# Patient Record
Sex: FEMALE | Race: WHITE | HISPANIC OR LATINO | Employment: STUDENT | URBAN - METROPOLITAN AREA
[De-identification: names, ages, dates, MRNs, and addresses within clinical notes are randomized per-mention and may not be internally consistent; named-entity substitution may affect disease eponyms.]

---
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
## 2021-01-05 ENCOUNTER — CLINICAL SUPPORT (OUTPATIENT)
Dept: OBGYN CLINIC | Facility: CLINIC | Age: 21
End: 2021-01-05
Payer: COMMERCIAL

## 2021-01-05 VITALS
RESPIRATION RATE: 16 BRPM | DIASTOLIC BLOOD PRESSURE: 60 MMHG | WEIGHT: 140.2 LBS | HEART RATE: 88 BPM | SYSTOLIC BLOOD PRESSURE: 90 MMHG | BODY MASS INDEX: 24.44 KG/M2

## 2021-01-05 DIAGNOSIS — N94.6 DYSMENORRHEA: Primary | ICD-10-CM

## 2021-01-05 DIAGNOSIS — Z30.42 ENCOUNTER FOR SURVEILLANCE OF INJECTABLE CONTRACEPTIVE: ICD-10-CM

## 2021-01-05 LAB — SL AMB POCT URINE HCG: NEGATIVE

## 2021-01-05 PROCEDURE — 96372 THER/PROPH/DIAG INJ SC/IM: CPT | Performed by: OBSTETRICS & GYNECOLOGY

## 2021-01-05 PROCEDURE — 81025 URINE PREGNANCY TEST: CPT | Performed by: OBSTETRICS & GYNECOLOGY

## 2021-01-05 RX ADMIN — MEDROXYPROGESTERONE ACETATE 150 MG: 150 INJECTION, SUSPENSION INTRAMUSCULAR at 11:28

## 2021-01-05 NOTE — PROGRESS NOTES
Patient here for second depo injection  Pt ID by name and   UPT negative  Pt denies any headaches, pain in arms/legs, chest pain, SOB, leg cramps, or vaginal bleeding  Discussed need for 1200 mg of calcium daily, including dietary sources, supplements as needed  Pt advised to call for SOB, chest pain, headaches, visual disturbances, abdominal pain, extremity pain  Pt verbalized understanding  Injection site: right deltoid    Next Injection Due: 3/23-    Last Annual: Scheduled for 2021 [place] [place])    Pt tolerated injection well

## 2021-02-22 ENCOUNTER — ANNUAL EXAM (OUTPATIENT)
Dept: OBGYN CLINIC | Facility: CLINIC | Age: 21
End: 2021-02-22
Payer: COMMERCIAL

## 2021-02-22 VITALS — SYSTOLIC BLOOD PRESSURE: 98 MMHG | WEIGHT: 136.8 LBS | DIASTOLIC BLOOD PRESSURE: 62 MMHG | BODY MASS INDEX: 23.85 KG/M2

## 2021-02-22 DIAGNOSIS — N92.6 IRREGULAR MENSTRUAL CYCLE: ICD-10-CM

## 2021-02-22 DIAGNOSIS — Z01.419 WOMEN'S ANNUAL ROUTINE GYNECOLOGICAL EXAMINATION: Primary | ICD-10-CM

## 2021-02-22 PROCEDURE — S0612 ANNUAL GYNECOLOGICAL EXAMINA: HCPCS | Performed by: OBSTETRICS & GYNECOLOGY

## 2021-02-22 RX ORDER — MEDROXYPROGESTERONE ACETATE 150 MG/ML
150 INJECTION, SUSPENSION INTRAMUSCULAR
Qty: 1 ML | Refills: 4 | Status: SHIPPED | OUTPATIENT
Start: 2021-02-22 | End: 2021-06-14 | Stop reason: ALTCHOICE

## 2021-02-22 NOTE — PROGRESS NOTES
ASSESSMENT & PLAN: Jacqueline Delgado is a 24 y o   with normal gynecologic exam     1   Routine well woman exam done today  2   Pap smears will start at age 25 per the ASCCP guidelines  3   Gardasil is recommended for patients from 526 years of age  The patient has had the Gardasil vaccine series  4  The patient is not currently sexually active  She is using depo provera for contraception/menstrual control and options have been discussed  5  The following were reviewed in today's visit: breast self exam, STD testing, use and side effects of OCPs, exercise and healthy diet  6  Patient to return to office in 12 months for annual      All questions have been answered to her satisfaction  CC:  Annual Gynecologic Examination    HPI: Jacqueline Delgado is a 24 y o  Nestor Morrissey who presents for annual gynecologic examination  She has the following concerns:  none  Health Maintenance:    She exercises 2 days per week  She wears her seatbelt routinely  She does perform irregular monthly self breast exams  She feels safe at home  Patients does follow a reg diet  Past Medical History:   Diagnosis Date    Eczematous dermatitis     LAST ASSESSED: 17    Microscopic hematuria     LAST ASSESSED: 17    Vitamin D deficiency     LAST ASSESSED: 17       No past surgical history on file  Past OB/Gyn History:  Period Pattern: (!) Irregular(on depo)  Menstrual Flow: LightNo LMP recorded  (Menstrual status: Birth Control)  Menstrual History:  OB History        0    Para   0    Term   0       0    AB   0    Living   0       SAB   0    TAB   0    Ectopic   0    Multiple   0    Live Births   0              History of sexually transmitted infection No  Patient is not currently sexually active  heterosexual Birth control: Depo-Provera Inj      Family History   Problem Relation Age of Onset    Sick sinus syndrome Paternal Grandmother         TACHYCARDIA    No Known Problems Mother     Hypertension Paternal Grandfather     Stroke Paternal Grandfather     Hyperlipidemia Paternal Grandfather     No Known Problems Sister     No Known Problems Brother     Diabetes Maternal Grandmother     Alcohol abuse Maternal Grandmother         stong history of alcoholism on mom's side of family     No Known Problems Maternal Grandfather        Social History:  Social History     Socioeconomic History    Marital status: Single     Spouse name: Not on file    Number of children: Not on file    Years of education: Not on file    Highest education level: Not on file   Occupational History    Occupation: Student   Social Needs    Financial resource strain: Not on file    Food insecurity     Worry: Not on file     Inability: Not on file   [employer] needs     Medical: Not on file     Non-medical: Not on file   Tobacco Use    Smoking status: Never Smoker    Smokeless tobacco: Never Used   Substance and Sexual Activity    Alcohol use: Not Currently     Alcohol/week: 0 0 standard drinks    Drug use: No    Sexual activity: Not Currently     Partners: Male   Lifestyle    Physical activity     Days per week: Not on file     Minutes per session: Not on file    Stress: Not on file   Relationships    Social connections     Talks on phone: Not on file     Gets together: Not on file     Attends Jainism service: Not on file     Active member of club or organization: Not on file     Attends meetings of clubs or organizations: Not on file     Relationship status: Not on file    Intimate partner violence     Fear of current or ex partner: Not on file     Emotionally abused: Not on file     Physically abused: Not on file     Forced sexual activity: Not on file   Other Topics Concern    Not on file   Social History Narrative    LEARNING DISABILITIES     Presently lives with her family  Patient is single    Patient is currently employed - [employer]  No Known Allergies    Current Outpatient Medications:     ergocalciferol (VITAMIN D2) 50,000 units, Take 1 capsule (50,000 Units total) by mouth once a week for 12 doses, Disp: 12 capsule, Rfl: 0    medroxyPROGESTERone (DEPO-PROVERA) 150 mg/mL injection, Inject 1 mL (150 mg total) into a muscle every 3 (three) months, Disp: 1 mL, Rfl: 4    Current Facility-Administered Medications:     medroxyPROGESTERone acetate (DEPO-PROVERA SYRINGE) IM injection 150 mg, 150 mg, Intramuscular, Q3 Months, [address] [facility], [place], 150 mg at 01/05/21 1128    Review of Systems:  Review of Systems   Constitutional: Negative  HENT: Negative  Respiratory: Negative  Cardiovascular: Negative  Gastrointestinal: Negative  Genitourinary: Negative  Neurological: Negative  Psychiatric/Behavioral: Negative  Physical Exam:  BP 98/62   Wt 62 1 kg (136 lb 12 8 oz)   Breastfeeding No   BMI 23 85 kg/m²    Physical Exam  Constitutional:       Appearance: Normal appearance  She is normal weight  HENT:      Head: Normocephalic and atraumatic  Eyes:      Extraocular Movements: Extraocular movements intact  Conjunctiva/sclera: Conjunctivae normal       Pupils: Pupils are equal, round, and reactive to light  Cardiovascular:      Rate and Rhythm: Normal rate and regular rhythm  Heart sounds: Normal heart sounds  No murmur  Pulmonary:      Effort: Pulmonary effort is normal  No respiratory distress  Breath sounds: Normal breath sounds  No wheezing or rales  Chest:      Breasts:         Right: Normal  No swelling, bleeding, inverted nipple, mass, nipple discharge, skin change or tenderness  Left: Normal  No swelling, bleeding, inverted nipple, mass, nipple discharge, skin change or tenderness  Abdominal:      General: Abdomen is flat  There is no distension  Palpations: Abdomen is soft  Tenderness: There is no abdominal tenderness  There is no guarding  Neurological:      General: No focal deficit present        Mental Status: She is alert and oriented to person, place, and time  Mental status is at baseline  Skin:     General: Skin is warm and dry  Vitals signs and nursing note reviewed       Pelvic - deferred

## 2021-03-22 ENCOUNTER — OFFICE VISIT (OUTPATIENT)
Dept: URGENT CARE | Facility: CLINIC | Age: 21
End: 2021-03-22
Payer: COMMERCIAL

## 2021-03-22 VITALS
OXYGEN SATURATION: 97 % | HEIGHT: 63 IN | SYSTOLIC BLOOD PRESSURE: 112 MMHG | DIASTOLIC BLOOD PRESSURE: 70 MMHG | WEIGHT: 140 LBS | HEART RATE: 71 BPM | BODY MASS INDEX: 24.8 KG/M2 | RESPIRATION RATE: 18 BRPM

## 2021-03-22 DIAGNOSIS — Z02.4 DRIVER'S PERMIT PHYSICAL EXAMINATION: Primary | ICD-10-CM

## 2021-03-22 PROCEDURE — 99213 OFFICE O/P EST LOW 20 MIN: CPT | Performed by: PHYSICIAN ASSISTANT

## 2021-03-22 NOTE — PROGRESS NOTES
[facility]        NAME: Zaid Morin is a 24 y o  female  : 2000    MRN: [id number]  DATE: 2021  TIME: 7:15 PM    Assessment and Plan   's permit physical examination [Z02 4]  1  's permit physical examination           Patient Instructions     Good luck driving! Follow up with PCP in 3-5 days  Proceed to  ER if symptoms worsen  Chief Complaint     Chief Complaint   Patient presents with    Physical Exam     Drivers Permit RT  LT 66/98 Bilateral 20/40 acknowledged correctly VISION CORRECTED  History of Present Illness         50-year-old female presents the office with her stepmother for 's permit physical   Patient has no pertinent past medical history  She denies any diabetes, cardiac disorders, seizure disorders  She states that she is feeling well today  She offers up no complaints  Patient does wear glasses for distance  She has been with her today  She denies any difficulties using extremities  She denies any numbness or tingling in extremities  Review of Systems   Review of Systems   Constitutional: Negative for chills and fever  HENT: Negative  Negative for hearing loss  Eyes: Negative for visual disturbance  Respiratory: Negative for chest tightness and shortness of breath  Cardiovascular: Negative for chest pain and palpitations  Gastrointestinal: Negative  Genitourinary: Negative  Musculoskeletal: Negative  Skin: Negative  Neurological: Negative for dizziness, light-headedness and headaches           Current Medications       Current Outpatient Medications:     medroxyPROGESTERone (DEPO-PROVERA) 150 mg/mL injection, Inject 1 mL (150 mg total) into a muscle every 3 (three) months, Disp: 1 mL, Rfl: 4    ergocalciferol (VITAMIN D2) 50,000 units, Take 1 capsule (50,000 Units total) by mouth once a week for 12 doses, Disp: 12 capsule, Rfl: 0    Current Facility-Administered Medications:    medroxyPROGESTERone acetate (DEPO-PROVERA SYRINGE) IM injection 150 mg, 150 mg, Intramuscular, Q3 Months, Antonino Magallon MD, 150 mg at 01/05/21 1128    Current Allergies     Allergies as of 03/22/2021    (No Known Allergies)            The following portions of the patient's history were reviewed and updated as appropriate: allergies, current medications, past family history, past medical history, past social history, past surgical history and problem list      Past Medical History:   Diagnosis Date    Eczematous dermatitis     LAST ASSESSED: 11/16/17    Microscopic hematuria     LAST ASSESSED: 8/17/17    Vitamin D deficiency     LAST ASSESSED: 8/17/17       History reviewed  No pertinent surgical history  Family History   Problem Relation Age of Onset    Sick sinus syndrome Paternal Grandmother         TACHYCARDIA    No Known Problems Mother     Hypertension Paternal Grandfather     Stroke Paternal Grandfather     Hyperlipidemia Paternal Grandfather     No Known Problems Sister     No Known Problems Brother     Diabetes Maternal Grandmother     Alcohol abuse Maternal Grandmother         stong history of alcoholism on mom's side of family     No Known Problems Maternal Grandfather          Medications have been verified  Objective   /70   Pulse 71   Resp 18   Ht 5' 3" (1 6 m)   Wt 63 5 kg (140 lb)   SpO2 97%   BMI 24 80 kg/m²   No LMP recorded  (Menstrual status: Birth Control)  Physical Exam     Physical Exam  Constitutional:       General: She is not in acute distress  Appearance: She is well-developed  She is not ill-appearing or diaphoretic  HENT:      Head: Normocephalic and atraumatic  Right Ear: Hearing, tympanic membrane, ear canal and external ear normal       Left Ear: Hearing, tympanic membrane, ear canal and external ear normal       Nose: Nose normal  No mucosal edema or rhinorrhea  Mouth/Throat:      Pharynx: Uvula midline   No oropharyngeal exudate, posterior oropharyngeal erythema or uvula swelling  Eyes:      General: Lids are normal       Extraocular Movements:      Right eye: Normal extraocular motion  Left eye: Normal extraocular motion  Conjunctiva/sclera: Conjunctivae normal       Pupils: Pupils are equal, round, and reactive to light  Comments: Glasses with patient today    Neck:      Musculoskeletal: Neck supple  Cardiovascular:      Rate and Rhythm: Normal rate and regular rhythm  Heart sounds: Normal heart sounds, S1 normal and S2 normal  No murmur  Pulmonary:      Effort: Pulmonary effort is normal  No respiratory distress  Breath sounds: Normal breath sounds  No stridor  No decreased breath sounds, wheezing or rales  Abdominal:      General: Bowel sounds are normal       Palpations: Abdomen is soft  Tenderness: There is no abdominal tenderness  Lymphadenopathy:      Cervical: No cervical adenopathy  Skin:     General: Skin is warm and dry  Findings: No rash  Neurological:      General: No focal deficit present  Mental Status: She is alert  Cranial Nerves: Cranial nerves are intact  Sensory: Sensation is intact  Motor: Motor function is intact  Coordination: Coordination is intact  Comments:   Cranial nerves 2-12  Grossly intact  Sensation intact to crude touch in bilateral upper and lower extremities  Strength 5/5 in bilateral upper and lower extremities  Patient able to walk on toes, on heels without difficulty  Gait is nonantalgic  Tandem walk within normal limits  Psychiatric:         Behavior: Behavior is cooperative

## 2021-03-23 ENCOUNTER — CLINICAL SUPPORT (OUTPATIENT)
Dept: OBGYN CLINIC | Facility: CLINIC | Age: 21
End: 2021-03-23
Payer: COMMERCIAL

## 2021-03-23 VITALS
BODY MASS INDEX: 24.27 KG/M2 | DIASTOLIC BLOOD PRESSURE: 72 MMHG | HEART RATE: 88 BPM | RESPIRATION RATE: 16 BRPM | SYSTOLIC BLOOD PRESSURE: 110 MMHG | WEIGHT: 137 LBS

## 2021-03-23 DIAGNOSIS — N94.6 DYSMENORRHEA: ICD-10-CM

## 2021-03-23 PROCEDURE — 96372 THER/PROPH/DIAG INJ SC/IM: CPT | Performed by: OBSTETRICS & GYNECOLOGY

## 2021-03-23 RX ADMIN — MEDROXYPROGESTERONE ACETATE 150 MG: 150 INJECTION, SUSPENSION INTRAMUSCULAR at 11:00

## 2021-03-23 NOTE — PROGRESS NOTES
Patient here for depo injection  Pt ID by name and   Pt denies any headaches, pain in arms/legs, chest pain, SOB, leg cramps, or vaginal bleeding  Discussed need for 1200 mg of calcium daily, including dietary sources, supplements as needed  Pt advised to call for SOB, chest pain, headaches, visual disturbances, abdominal pain, extremity pain  Pt verbalized understanding  Injection site:  Left deltoid    Next Injection Due:  -    Last Annual:  2021    Pt tolerated injection well

## 2021-04-05 DIAGNOSIS — Z23 ENCOUNTER FOR IMMUNIZATION: ICD-10-CM

## 2021-04-06 ENCOUNTER — IMMUNIZATIONS (OUTPATIENT)
Dept: FAMILY MEDICINE CLINIC | Facility: HOSPITAL | Age: 21
End: 2021-04-06

## 2021-04-06 DIAGNOSIS — Z23 ENCOUNTER FOR IMMUNIZATION: Primary | ICD-10-CM

## 2021-04-06 PROCEDURE — 91300 SARS-COV-2 / COVID-19 MRNA VACCINE (PFIZER-BIONTECH) 30 MCG: CPT

## 2021-04-06 PROCEDURE — 0001A SARS-COV-2 / COVID-19 MRNA VACCINE (PFIZER-BIONTECH) 30 MCG: CPT

## 2021-04-12 ENCOUNTER — OFFICE VISIT (OUTPATIENT)
Dept: FAMILY MEDICINE CLINIC | Facility: CLINIC | Age: 21
End: 2021-04-12
Payer: COMMERCIAL

## 2021-04-12 VITALS
BODY MASS INDEX: 22.81 KG/M2 | SYSTOLIC BLOOD PRESSURE: 108 MMHG | OXYGEN SATURATION: 98 % | HEIGHT: 64 IN | TEMPERATURE: 97.9 F | HEART RATE: 74 BPM | RESPIRATION RATE: 14 BRPM | DIASTOLIC BLOOD PRESSURE: 70 MMHG | WEIGHT: 133.6 LBS

## 2021-04-12 DIAGNOSIS — Z00.00 WELL ADULT EXAM: Primary | ICD-10-CM

## 2021-04-12 DIAGNOSIS — G47.8 SLEEP PARALYSIS: ICD-10-CM

## 2021-04-12 DIAGNOSIS — E55.9 VITAMIN D DEFICIENCY: ICD-10-CM

## 2021-04-12 DIAGNOSIS — F98.8 ATTENTION DEFICIT DISORDER (ADD) WITHOUT HYPERACTIVITY: ICD-10-CM

## 2021-04-12 PROCEDURE — 99214 OFFICE O/P EST MOD 30 MIN: CPT | Performed by: FAMILY MEDICINE

## 2021-04-12 PROCEDURE — 99395 PREV VISIT EST AGE 18-39: CPT | Performed by: FAMILY MEDICINE

## 2021-04-12 RX ORDER — DEXTROAMPHETAMINE SACCHARATE, AMPHETAMINE ASPARTATE MONOHYDRATE, DEXTROAMPHETAMINE SULFATE AND AMPHETAMINE SULFATE 2.5; 2.5; 2.5; 2.5 MG/1; MG/1; MG/1; MG/1
10 CAPSULE, EXTENDED RELEASE ORAL EVERY MORNING
Qty: 30 CAPSULE | Refills: 0 | Status: SHIPPED | OUTPATIENT
Start: 2021-04-12 | End: 2021-05-11

## 2021-04-12 RX ORDER — ERGOCALCIFEROL 1.25 MG/1
50000 CAPSULE ORAL WEEKLY
Qty: 12 CAPSULE | Refills: 0 | Status: SHIPPED | OUTPATIENT
Start: 2021-04-12 | End: 2021-05-11

## 2021-04-12 NOTE — PROGRESS NOTES
Assessment/Plan:  1  Well adult exam  See below     2  Attention deficit disorder (ADD) without hyperactivity  Longstandign, not well controlled  Reviewed pros and cons of controlled substances along with expectations for prescribing  Side effects reviewed  Follow up 3 weeks  Watch sleep and appetite/weight   - amphetamine-dextroamphetamine (ADDERALL XR) 10 MG 24 hr capsule; Take 1 capsule (10 mg total) by mouth every morningMax Daily Amount: 10 mg  Dispense: 30 capsule; Refill: 0    3  Sleep paralysis  Refer for sleep study  Daytime fatigue will likely improve with treatment for ADD    4  Vitamin D deficiency  Missed last 3 doses -   Take x 4 weeks then update blood work   - ergocalciferol (VITAMIN D2) 50,000 units; Take 1 capsule (50,000 Units total) by mouth once a week for 12 doses  Dispense: 12 capsule; Refill: 0        Return for 3 - 4 weeks virtual ADD check (Mon or Tues)   Subjective:   Alice Omalley is a 24 y o  female here today for a follow-up on her current medical conditions:  Patient Active Problem List   Diagnosis    Attention deficit disorder (ADD) without hyperactivity    Irregular menstrual cycle    Vitamin D deficiency    Dysmenorrhea        Current Outpatient Medications   Medication Sig Dispense Refill    medroxyPROGESTERone (DEPO-PROVERA) 150 mg/mL injection Inject 1 mL (150 mg total) into a muscle every 3 (three) months 1 mL 4    amphetamine-dextroamphetamine (ADDERALL XR) 10 MG 24 hr capsule Take 1 capsule (10 mg total) by mouth every morningMax Daily Amount: 10 mg 30 capsule 0    ergocalciferol (VITAMIN D2) 50,000 units Take 1 capsule (50,000 Units total) by mouth once a week for 12 doses 12 capsule 0     No current facility-administered medications for this visit  HPI:  Chief Complaint   Patient presents with    Physical Exam    Insomnia    ADHD     -- Above per clinical staff and reviewed   --    PHQ-9 Depression Screening    PHQ-9:   Frequency of the following problems over the past two weeks:      Little interest or pleasure in doing things: 0 - not at all  Feeling down, depressed, or hopeless: 0 - not at all  Trouble falling or staying asleep, or sleeping too much: 2 - more than half the days  Feeling tired or having little energy: 3 - nearly every day  Poor appetite or overeatin - not at all  Feeling bad about yourself - or that you are a failure or have let yourself or your family down: 0 - not at all  Trouble concentrating on things, such as reading the newspaper or watching television: 2 - more than half the days  Moving or speaking so slowly that other people could have noticed  Or the opposite - being so fidgety or restless that you have been moving around a lot more than usual: 0 - not at all  Thoughts that you would be better off dead, or of hurting yourself in some way: 0 - not at all  PHQ-2 Score: 0  PHQ-9 Score: 7       POPPY-7 Flowsheet Screening      Most Recent Value   Over the last two weeks, how often have you been bothered by the following problems? Feeling nervous, anxious, or on edge  0   Not being able to stop or control worrying  0   Worrying too much about different things  0   Trouble relaxing   0   Being so restless that it's hard to sit still  0   Becoming easily annoyed or irritable   1   Feeling afraid as if something awful might happen  0   How difficult have these problems made it for you to do your work, take care of things at home, or get along with other people? Not difficult at all   POPPY Score   1            due for vit D (ordered)   siblings Ana Maria Mohrlpine  labs 2020 normal other than low vit D 19 - rx started  Today:  Here today with her step mom   Was having a lot of trouble sleeping - better now   Was havign sleep paralysis often  - often in the morning   Takes a long time to fall asleep   Also nto sleeping well   Working at [employer]   [address]  10 hrs  Packing  Easy to "doze off " hard to focus  Organization poor   Difficult to pay attention  Thinking about doing a class at school  Loses things often  Loses papers, important things  Cannot find things  Puts a lot of effort into organizing  Trying to eat better     The following portions of the patient's history were reviewed and updated as appropriate: allergies, current medications, past family history, past medical history, past social history, past surgical history and problem list    Current Medications                                                     Objective:     /70  Pulse 74  Temp 97 9 °F (36 6 °C) (Temporal)  Resp 14  Ht 5' 3 75" (1 619 m)  Wt 60 6 kg (133 lb 9 6 oz)  SpO2 98%  BMI 23 11 kg/m²       BP Readings from Last 3 Encounters:   04/12/21 108/70   03/23/21 110/72   03/22/21 112/70         Wt Readings from Last 3 Encounters:   04/12/21 60 6 kg (133 lb 9 6 oz) (60 %, Z= 0 25)*   03/23/21 62 1 kg (137 lb) (65 %, Z= 0 39)*   03/22/21 63 5 kg (140 lb) (69 %, Z= 0 50)*     * Growth percentiles are based on CDC (Girls, 2-20 Years) data  Review of Systems   ROS: all others negative - no chest pain, SOB, normal urine and bowels  no GERD  Not sleeping well  mood good  Denies depression or anxiety  + inattention  Physical Exam   Constitutional: she appears well-developed and well-nourished  HENT: Head: Normocephalic  Right Ear: External ear normal  Tympanic membrane normal    Left Ear: External ear normal  Tympanic membrane normal    Nose: Nose normal  No mucosal edema, No rhinorrhea  Right sinus exhibits no maxillary sinus tenderness  Left sinus exhibits no maxillary sinus tenderness  Mouth/Throat: Oropharynx is clear and moist    Eyes: Normal conjunctiva  No erythema  No discharge  Neck: No pain on exam  Neck supple  Cardiovascular: Normal rate, regular rhythm and normal heart sounds  Pulmonary/Chest: Effort normal and breath sounds normal  No wheezes  No rales  No rhonchi  Abdominal: Soft  Bowel sounds are normal  There is no tenderness  Musculoskeletal: she exhibits no edema  Lymphadenopathy: she has no cervical adenopathy  Neurological: she is alert and oriented to person, place, and time  Skin: Skin is warm and dry  No rashes  Psychiatric: she has a normal mood and affect  her behavior is normal  Thought content normal    Vitals reviewed

## 2021-04-12 NOTE — PROGRESS NOTES
Assessment/Plan:     Well adult exam  ·         Continue healthy diet   ·         Encourage exercise 4 times a week or more for minimum 30 minutes  ·         Continue to see dentist, wear seatbelt  ·         Health maintenance reviewed and up-to-date  Reviewed age appropriate health maintenance screenings and immunizations that are due, risks and benefits of these  Health Maintenance   Topic Date Due    PT PLAN OF CARE  11/15/2018    Annual Physical  2021    Chlamydia Screening  2021 (Originally 2017)    COVID-19 Vaccine (2 - Pfizer 2-dose series) 2021    Depression Screening PHQ  2022    BMI: Adult  2022    DTaP,Tdap,and Td Vaccines (7 - Td) 2022    HIV Screening  Completed    Hepatitis C Screening  Completed    HIB Vaccine  Completed    Hepatitis B Vaccine  Completed    IPV Vaccine  Completed    Hepatitis A Vaccine  Completed    Meningococcal ACWY Vaccine  Completed    Influenza Vaccine  Completed    HPV Vaccine  Completed    Pneumococcal Vaccine: Pediatrics (0 to 5 Years) and At-Risk Patients (6 to 59 Years)  Aged Out     Return for 3 - 4 weeks virtual ADD check (Mon or Tues)       Subjective:    JAKE Klein is a 24 y o  female who presents today for a physical      Chief Complaint   Patient presents with    Physical Exam    Insomnia    ADHD     PHQ-9 Depression Screening    PHQ-9:   Frequency of the following problems over the past two weeks:      Little interest or pleasure in doing things: 0 - not at all  Feeling down, depressed, or hopeless: 0 - not at all  Trouble falling or staying asleep, or sleeping too much: 2 - more than half the days  Feeling tired or having little energy: 3 - nearly every day  Poor appetite or overeatin - not at all  Feeling bad about yourself - or that you are a failure or have let yourself or your family down: 0 - not at all  Trouble concentrating on things, such as reading the newspaper or watching television: 2 - more than half the days  Moving or speaking so slowly that other people could have noticed  Or the opposite - being so fidgety or restless that you have been moving around a lot more than usual: 0 - not at all  Thoughts that you would be better off dead, or of hurting yourself in some way: 0 - not at all  PHQ-2 Score: 0  PHQ-9 Score: 7        ---Above per clinical staff & reviewed  ---  Patient here today for a physical:    Diet: doing well  Not hungry for breakfast - uses protein shakes in am when she works  Often does not dinner  Not hungry  Exercise:  Walks  Physical job  Dental visits:  Yes   Seatbelt: yes     Concerns today:  Here today with her step mom   Was having a lot of trouble sleeping - better now   Was havign sleep paralysis often  - often in the morning   Takes a long time to fall asleep  Also nto sleeping well   Working at [employer]  [address]  10 hrs  Packing  Easy to "doze off " hard to focus  Organization poor  Difficult to pay attention  Thinking about doing a class at school  Loses things often  Loses papers, important things  Cannot find things  Puts a lot of effort into organizing  Trying to eat better         The following portions of the patient's history were reviewed and updated as appropriate: allergies, current medications, past family history, past medical history, past social history, past surgical history and problem list      Current Outpatient Medications   Medication Sig Dispense Refill    medroxyPROGESTERone (DEPO-PROVERA) 150 mg/mL injection Inject 1 mL (150 mg total) into a muscle every 3 (three) months 1 mL 4    amphetamine-dextroamphetamine (ADDERALL XR) 10 MG 24 hr capsule Take 1 capsule (10 mg total) by mouth every morningMax Daily Amount: 10 mg 30 capsule 0    ergocalciferol (VITAMIN D2) 50,000 units Take 1 capsule (50,000 Units total) by mouth once a week for 12 doses 12 capsule 0     No current facility-administered medications for this visit  Objective:      /70   Pulse 74   Temp 97 9 °F (36 6 °C) (Temporal)   Resp 14   Ht 5' 3 75" (1 619 m)   Wt 60 6 kg (133 lb 9 6 oz)   SpO2 98%   BMI 23 11 kg/m²   BP Readings from Last 3 Encounters:   04/12/21 108/70   03/23/21 110/72   03/22/21 112/70     Wt Readings from Last 3 Encounters:   04/12/21 60 6 kg (133 lb 9 6 oz) (60 %, Z= 0 25)*   03/23/21 62 1 kg (137 lb) (65 %, Z= 0 39)*   03/22/21 63 5 kg (140 lb) (69 %, Z= 0 50)*     * Growth percentiles are based on CDC (Girls, 2-20 Years) data  Review of Systems  ROS:  all others negative - no chest pain, SOB, normal urine and bowels  no GERD  Not sleeping well  mood good  Denies depression or anxiety  + inattention  Physical Exam   Constitutional: she appears well-developed and well-nourished  HENT: Head: Normocephalic  Right Ear: External ear normal  Tympanic membrane normal    Left Ear: External ear normal  Tympanic membrane normal    Nose: Nose normal  No mucosal edema, No rhinorrhea  Right sinus exhibits no maxillary sinus tenderness  Left sinus exhibits no maxillary sinus tenderness  Mouth/Throat: Oropharynx is clear and moist    Eyes: Normal conjunctiva  No erythema  No discharge  Neck: No pain on exam  Neck supple  Cardiovascular: Normal rate, regular rhythm and normal heart sounds  Pulmonary/Chest: Effort normal and breath sounds normal  No wheezes  No rales  No rhonchi  Abdominal: Soft  Bowel sounds are normal  There is no tenderness  Musculoskeletal: she exhibits no edema  Lymphadenopathy: she has no cervical adenopathy  Neurological: she  is alert and oriented to person, place, and time  Skin: Skin is warm and dry  No rashes  Psychiatric: she  has a normal mood and affect  her behavior is normal  Thought content normal    Vitals reviewed  Depression Screening and Follow-up Plan: Patient's depression screening was positive with a PHQ-2 score of 0  Their PHQ-9 score was 7   Patient assessed for underlying major depression  Brief counseling provided and recommend additional follow-up/re-evaluation next office visit

## 2021-05-02 ENCOUNTER — IMMUNIZATIONS (OUTPATIENT)
Dept: FAMILY MEDICINE CLINIC | Facility: HOSPITAL | Age: 21
End: 2021-05-02

## 2021-05-02 DIAGNOSIS — Z23 ENCOUNTER FOR IMMUNIZATION: Primary | ICD-10-CM

## 2021-05-02 PROCEDURE — 0002A SARS-COV-2 / COVID-19 MRNA VACCINE (PFIZER-BIONTECH) 30 MCG: CPT

## 2021-05-02 PROCEDURE — 91300 SARS-COV-2 / COVID-19 MRNA VACCINE (PFIZER-BIONTECH) 30 MCG: CPT

## 2021-05-10 ENCOUNTER — TRANSCRIBE ORDERS (OUTPATIENT)
Dept: LAB | Facility: CLINIC | Age: 21
End: 2021-05-10

## 2021-05-10 ENCOUNTER — LAB (OUTPATIENT)
Dept: LAB | Facility: CLINIC | Age: 21
End: 2021-05-10
Payer: COMMERCIAL

## 2021-05-10 DIAGNOSIS — E55.9 VITAMIN D DEFICIENCY: ICD-10-CM

## 2021-05-10 LAB — 25(OH)D3 SERPL-MCNC: 72.5 NG/ML (ref 30–100)

## 2021-05-10 PROCEDURE — 36415 COLL VENOUS BLD VENIPUNCTURE: CPT

## 2021-05-10 PROCEDURE — 82306 VITAMIN D 25 HYDROXY: CPT

## 2021-05-11 ENCOUNTER — OFFICE VISIT (OUTPATIENT)
Dept: FAMILY MEDICINE CLINIC | Facility: CLINIC | Age: 21
End: 2021-05-11
Payer: COMMERCIAL

## 2021-05-11 ENCOUNTER — TELEPHONE (OUTPATIENT)
Dept: FAMILY MEDICINE CLINIC | Facility: CLINIC | Age: 21
End: 2021-05-11

## 2021-05-11 VITALS
RESPIRATION RATE: 12 BRPM | HEIGHT: 64 IN | SYSTOLIC BLOOD PRESSURE: 100 MMHG | BODY MASS INDEX: 21.99 KG/M2 | OXYGEN SATURATION: 98 % | TEMPERATURE: 98 F | WEIGHT: 128.8 LBS | HEART RATE: 110 BPM | DIASTOLIC BLOOD PRESSURE: 60 MMHG

## 2021-05-11 DIAGNOSIS — B36.0 PV (PITYRIASIS VERSICOLOR): ICD-10-CM

## 2021-05-11 DIAGNOSIS — E55.9 VITAMIN D DEFICIENCY: ICD-10-CM

## 2021-05-11 DIAGNOSIS — F98.8 ATTENTION DEFICIT DISORDER (ADD) WITHOUT HYPERACTIVITY: Primary | ICD-10-CM

## 2021-05-11 PROCEDURE — 99214 OFFICE O/P EST MOD 30 MIN: CPT | Performed by: FAMILY MEDICINE

## 2021-05-11 RX ORDER — DEXTROAMPHETAMINE SACCHARATE, AMPHETAMINE ASPARTATE, DEXTROAMPHETAMINE SULFATE AND AMPHETAMINE SULFATE 1.25; 1.25; 1.25; 1.25 MG/1; MG/1; MG/1; MG/1
5 TABLET ORAL 2 TIMES DAILY
Qty: 60 TABLET | Refills: 0 | Status: SHIPPED | OUTPATIENT
Start: 2021-05-11 | End: 2021-05-25 | Stop reason: SDUPTHER

## 2021-05-11 RX ORDER — MELATONIN
1000 DAILY
Start: 2021-05-11

## 2021-05-11 NOTE — TELEPHONE ENCOUNTER
STEP MOM CALLED ASKING ABOUT CHANGE TO MEDICATION  NO MEDICATION AT PHARMACY  PER TODAY'S VISIT:     Attention deficit disorder (ADD) without hyperactivity  Better controlled and happy with adderall XR 10 mg, but heart rate high today     Will try switching to short acting qd to twice a day to see if this helps

## 2021-05-11 NOTE — TELEPHONE ENCOUNTER
Placed call to pharmacy - medication is now ready for , med wasn't ready at the time patient went to get it  Placed call to patient to inform her med is ready for  now

## 2021-05-11 NOTE — PROGRESS NOTES
Assessment/Plan:      1  Attention deficit disorder (ADD) without hyperactivity  Better controlled and happy with adderall XR 10 mg, but heart rate high today  Will try switching to short acting qd to twice a day to see if this helps   Follow up 2 weeks or so in person   - amphetamine-dextroamphetamine (ADDERALL) 5 MG tablet; Take 1 tablet (5 mg total) by mouth 2 (two) times a dayMax Daily Amount: 10 mg  Dispense: 60 tablet; Refill: 0    2  Vitamin D deficiency  Improved nicely   Switch from prescription to over the counter now   - cholecalciferol (VITAMIN D3) 1,000 units tablet; Take 1 tablet (1,000 Units total) by mouth daily Over the counter    3  PV (pityriasis versicolor)  Does nto appear active but can use selsum blue x 4 weeks            Patient Instructions   selsum blue for about 4 weeks       No follow-ups on file  HPI  Lynsey Mcknight is a 24 y o  female who presents for follow up on mood  HPI     Follow-up      Additional comments: ADD          Last edited by Idania Livingston MA on 5/11/2021 11:27 AM  (History)          Today:    No flowsheet data found  No flowsheet data found  PHQ-9 Depression Screening    PHQ-9:   Frequency of the following problems over the past two weeks:             forget to take take it at the same time   Feels more focused   More awake     Concentration better  Following through better  Improved performance   Finishing things she started and faster   Sleeping   At first felt like she felt anxious but that went away  Appetite seems normal to her             The following portions of the patient's history were reviewed and updated as appropriate: allergies, current medications, past family history, past medical history, past social history, past surgical history and problem list   Review of Systems   see HPI    Objective:    /60   Pulse (!) 110   Temp 98 °F (36 7 °C)   Resp 12   Ht 5' 3 75" (1 619 m)   Wt 58 4 kg (128 lb 12 8 oz)   SpO2 98%   BMI 22 28 kg/m² BP Readings from Last 3 Encounters:   05/11/21 100/60   04/12/21 108/70   03/23/21 110/72     Pulse Readings from Last 3 Encounters:   05/11/21 (!) 110   04/12/21 74   03/23/21 88     Wt Readings from Last 3 Encounters:   05/11/21 58 4 kg (128 lb 12 8 oz) (51 %, Z= 0 03)*   04/12/21 60 6 kg (133 lb 9 6 oz) (60 %, Z= 0 25)*   03/23/21 62 1 kg (137 lb) (65 %, Z= 0 39)*     * Growth percentiles are based on [facility] (Girls, 2-20 Years) data  Current Outpatient Medications   Medication Sig Dispense Refill    medroxyPROGESTERone (DEPO-PROVERA) 150 mg/mL injection Inject 1 mL (150 mg total) into a muscle every 3 (three) months 1 mL 4    amphetamine-dextroamphetamine (ADDERALL) 5 MG tablet Take 1 tablet (5 mg total) by mouth 2 (two) times a dayMax Daily Amount: 10 mg 60 tablet 0    cholecalciferol (VITAMIN D3) 1,000 units tablet Take 1 tablet (1,000 Units total) by mouth daily Over the counter       No current facility-administered medications for this visit  Physical Exam:  Constitutional: she appears well-developed and well-nourished  Psychiatric: she has a normal mood and affect  her speech is normal and behavior are normal  Judgment and thought content normal  Cognition and memory are normal    Skin back : hypopigmented patches  No erythema or scaling

## 2021-05-25 ENCOUNTER — OFFICE VISIT (OUTPATIENT)
Dept: FAMILY MEDICINE CLINIC | Facility: CLINIC | Age: 21
End: 2021-05-25
Payer: COMMERCIAL

## 2021-05-25 ENCOUNTER — OFFICE VISIT (OUTPATIENT)
Dept: SLEEP CENTER | Facility: CLINIC | Age: 21
End: 2021-05-25
Payer: COMMERCIAL

## 2021-05-25 VITALS
RESPIRATION RATE: 14 BRPM | SYSTOLIC BLOOD PRESSURE: 98 MMHG | HEART RATE: 88 BPM | WEIGHT: 131.8 LBS | OXYGEN SATURATION: 99 % | DIASTOLIC BLOOD PRESSURE: 62 MMHG | BODY MASS INDEX: 22.5 KG/M2 | TEMPERATURE: 97.7 F | HEIGHT: 64 IN

## 2021-05-25 VITALS
BODY MASS INDEX: 23.57 KG/M2 | HEART RATE: 89 BPM | HEIGHT: 63 IN | WEIGHT: 133 LBS | SYSTOLIC BLOOD PRESSURE: 116 MMHG | DIASTOLIC BLOOD PRESSURE: 60 MMHG

## 2021-05-25 DIAGNOSIS — F98.8 ATTENTION DEFICIT DISORDER (ADD) WITHOUT HYPERACTIVITY: ICD-10-CM

## 2021-05-25 DIAGNOSIS — G47.419 PRIMARY NARCOLEPSY WITHOUT CATAPLEXY: Primary | ICD-10-CM

## 2021-05-25 DIAGNOSIS — G47.8 SLEEP PARALYSIS: ICD-10-CM

## 2021-05-25 PROCEDURE — 99244 OFF/OP CNSLTJ NEW/EST MOD 40: CPT | Performed by: INTERNAL MEDICINE

## 2021-05-25 PROCEDURE — 99214 OFFICE O/P EST MOD 30 MIN: CPT | Performed by: FAMILY MEDICINE

## 2021-05-25 RX ORDER — DEXTROAMPHETAMINE SACCHARATE, AMPHETAMINE ASPARTATE, DEXTROAMPHETAMINE SULFATE AND AMPHETAMINE SULFATE 1.25; 1.25; 1.25; 1.25 MG/1; MG/1; MG/1; MG/1
5 TABLET ORAL 2 TIMES DAILY
Qty: 60 TABLET | Refills: 0 | Status: SHIPPED | OUTPATIENT
Start: 2021-05-25 | End: 2021-08-31

## 2021-05-25 NOTE — PATIENT INSTRUCTIONS
Adderall before work and at lunch  Melatonin 10 mg one hour before bed   Check your heart rate a few times and call me if more than 100

## 2021-05-25 NOTE — PROGRESS NOTES
Assessment/Plan:  Problem List Items Addressed This Visit        Unprioritized    Attention deficit disorder (ADD) without hyperactivity    Relevant Medications    amphetamine-dextroamphetamine (ADDERALL) 5 MG tablet      pt thinks she is doing well on this med  Not sure if this is affecting sleep   Recommend taking second dose earlier and increase dose of melatonin   Call us to let us know if this helps and how her heart rate is (she did not take med today)   Call for refills   Follow up 3 mos - in person or virtual   Seeing sleep specialist today     Return in about 3 months (around 2021)  Subjective:   Ashley Klein is a 24 y o  female here today for a follow-up on her current medical conditions:  Patient Active Problem List   Diagnosis    Attention deficit disorder (ADD) without hyperactivity    Irregular menstrual cycle    Vitamin D deficiency    Dysmenorrhea        Current Outpatient Medications   Medication Sig Dispense Refill    amphetamine-dextroamphetamine (ADDERALL) 5 MG tablet Take 1 tablet (5 mg total) by mouth 2 (two) times a dayMax Daily Amount: 10 mg 60 tablet 0    cholecalciferol (VITAMIN D3) 1,000 units tablet Take 1 tablet (1,000 Units total) by mouth daily Over the counter      medroxyPROGESTERone (DEPO-PROVERA) 150 mg/mL injection Inject 1 mL (150 mg total) into a muscle every 3 (three) months 1 mL 4     No current facility-administered medications for this visit  HPI:  Chief Complaint   Patient presents with    Follow-up     ADD     -- Above per clinical staff and reviewed   --    PHQ-9 Depression Screening    PHQ-9:   Frequency of the following problems over the past two weeks:      Little interest or pleasure in doing things: 0 - not at all  Feeling down, depressed, or hopeless: 0 - not at all  Trouble falling or staying asleep, or sleeping too much: 2 - more than half the days  Feeling tired or having little energy: 2 - more than half the days  Poor appetite or overeatin - several days  Feeling bad about yourself - or that you are a failure or have let yourself or your family down: 0 - not at all  Trouble concentrating on things, such as reading the newspaper or watching television: 0 - not at all  Moving or speaking so slowly that other people could have noticed  Or the opposite - being so fidgety or restless that you have been moving around a lot more than usual: 0 - not at all  Thoughts that you would be better off dead, or of hurting yourself in some way: 0 - not at all  PHQ-2 Score: 0  PHQ-9 Score: 5         vit d 19 to 72 on rx level   due for vit D (ordered)   siblings Malka Primmer  labs Nov 2020 normal other than low vit D 19 - rx started  Today:  Working   Concentration good  Staying organized  Appetite the same  Takes medicine work days  Thinks it is making a dfifferentc e  No side effects   AC is broken   Not sleeping well   Using melatonin 3 mg at bedtime this week and helpjng   Taking second tablet at 3 pm   Sleep consult today   Sleep paraysis feb, march   That is better now       The following portions of the patient's history were reviewed and updated as appropriate: allergies, current medications, past family history, past medical history, past social history, past surgical history and problem list     Objective:  Vitals:  BP 98/62   Pulse 88   Temp 97 7 °F (36 5 °C) (Temporal)   Resp 14   Ht 5' 3 75" (1 619 m)   Wt 59 8 kg (131 lb 12 8 oz)   SpO2 99%   BMI 22 80 kg/m²    Wt Readings from Last 3 Encounters:   05/25/21 59 8 kg (131 lb 12 8 oz) (56 %, Z= 0 16)*   05/11/21 58 4 kg (128 lb 12 8 oz) (51 %, Z= 0 03)*   04/12/21 60 6 kg (133 lb 9 6 oz) (60 %, Z= 0 25)*     * Growth percentiles are based on CDC (Girls, 2-20 Years) data  BP Readings from Last 3 Encounters:   05/25/21 98/62   05/11/21 100/60   04/12/21 108/70        Review of Systems   She has no other concerns  No unexpected weight changes  No chest pain, SOB, or palpitations  No GERD   No changes in bowels or bladder  Sleeping well  No mood changes  Denies depression  Physical Exam   Constitutional:  she appears well-developed and well-nourished  HENT: Head: Normocephalic  Neck: Neck supple  Cardiovascular: Normal rate, regular rhythm and normal heart sounds  Pulmonary/Chest: Effort normal and breath sounds normal  No wheezes, rales, or rhonchi  Abdominal: Soft  Bowel sounds are normal  There is no tenderness  No hepatosplenomegaly  Musculoskeletal: she exhibits no edema  Lymphadenopathy: she has no cervical adenopathy  Neurological: she is alert and oriented to person, place, and time  Skin: Skin is warm and dry  Psychiatric: she has a normal mood and affect  her behavior is normal  Thought content normal  Answers questions with yes, no or mainly I don't know/I guess  Depression Screening and Follow-up Plan: Patient's depression screening was positive with a PHQ-2 score of 0  Their PHQ-9 score was 5  Patient assessed for underlying major depression  Brief counseling provided and recommend additional follow-up/re-evaluation next office visit

## 2021-05-25 NOTE — PROGRESS NOTES
Consultation - Sonam Baird 24 : 2000  MRN: [id number]      Assessment:  Although the patient denies cataplexy, the presence of excessive sleepiness, sleep paralysis and vivid dreaming support the diagnosis of narcolepsy  Plan:  PSG/MSLT    Follow up: After testing    History of Present Illness:   24 y  o female with a history of recurrent episodes of sleep paralysis since age 16  She also reports realistic dreams, but denies daytime hallucinations or cataplexy  She has mild snoring, but no witnessed apneas  She denies any leg movements or symptoms consistent with restless legs  She is being treated for attention deficit disorder with Adderall  She denies falling asleep inappropriately  Her sleep hygiene is unremarkable  She works day shifts at [employer]        Review of Systems      Genitourinary need to urinate more than twice a night   Cardiology none   Gastrointestinal none   Neurology forgetfulness, poor concentration or confusion,  and difficulty with memory   Constitutional fatigue and weight change   Integumentary itching   Psychiatry aggressiveness or irritability and mood change   Musculoskeletal back pain   Pulmonary none   ENT ringing in ears   Endocrine none   Hematological none         I have reviewed and updated the review of systems as necessary    Historical Information    Past Medical History:  ADD, dysmenorrhea    Family History: non-contributory    Social History     Socioeconomic History    Marital status: Single     Spouse name: None    Number of children: 0    Years of education: None    Highest education level: None   Occupational History    Occupation: Student   Social Needs    Financial resource strain: None    Food insecurity     Worry: None     Inability: None    Transportation needs     Medical: None     Non-medical: None   Tobacco Use    Smoking status: Never Smoker    Smokeless tobacco: Never Used   Substance and Sexual Activity    Alcohol use: Not Currently     Alcohol/week: 0 0 standard drinks    Drug use: No    Sexual activity: Not Currently     Partners: Male     Birth control/protection: Injection   Lifestyle    Physical activity     Days per week: None     Minutes per session: None    Stress: None   Relationships    Social connections     Talks on phone: None     Gets together: None     Attends Gnosticist service: None     Active member of club or organization: None     Attends meetings of clubs or organizations: None     Relationship status: None    Intimate partner violence     Fear of current or ex partner: None     Emotionally abused: None     Physically abused: None     Forced sexual activity: None   Other Topics Concern    None   Social History Narrative    LEARNING DISABILITIES         Sleep Schedule: unremarkable    Snoring:  Yes    Witnessed Apnea:  No    Medications/Allergies:    Current Outpatient Medications:     amphetamine-dextroamphetamine (ADDERALL) 5 MG tablet, Take 1 tablet (5 mg total) by mouth 2 (two) times a dayMax Daily Amount: 10 mg, Disp: 60 tablet, Rfl: 0    cholecalciferol (VITAMIN D3) 1,000 units tablet, Take 1 tablet (1,000 Units total) by mouth daily Over the counter, Disp: , Rfl:     medroxyPROGESTERone (DEPO-PROVERA) 150 mg/mL injection, Inject 1 mL (150 mg total) into a muscle every 3 (three) months, Disp: 1 mL, Rfl: 4        No notes on file                  Objective:    Vital Signs:   Vitals:    05/25/21 1200   BP: 116/60   Pulse: 89   Weight: 60 3 kg (133 lb)   Height: 5' 3" (1 6 m)     Neck Circumference: 14      [place] Sleepiness Scale:  Total score: 13    Physical Exam:    General: Alert, appropriate, cooperative, thin    Head: NC/AT, no retrognathia    Nose: No septal deviation, nares not obstructed, mucosa normal    Throat: Airway diminished, tongue base thickened, 1+ tonsils visualized, low hanging palate    Neck: Normal, no thyromegaly or lymphadenopathy, no JVD    Heart: RR, normal S1 and S2, no murmurs    Chest: Clear bilaterally    Extremity: No clubbing, cyanosis, no edema    Skin: Warm, dry    Neuro: No motor abnormalities, cranial nerves appear intact        Counseling / Coordination of Care  A description of the counseling / coordination of care: We discussed the differential diagnosis for excessive sleepiness  Board Certified Sleep Specialist    Portions of the record may have been created with voice recognition software  Occasional wrong word or "sound a like" substitutions may have occurred due to the inherent limitations of voice recognition software  Read the chart carefully and recognize, using context, where substitutions have occurred

## 2021-06-14 ENCOUNTER — HOSPITAL ENCOUNTER (OUTPATIENT)
Dept: SLEEP CENTER | Facility: CLINIC | Age: 21
Discharge: HOME | End: 2021-06-14
Payer: COMMERCIAL

## 2021-06-14 ENCOUNTER — CLINICAL SUPPORT (OUTPATIENT)
Dept: OBGYN CLINIC | Facility: CLINIC | Age: 21
End: 2021-06-14
Payer: COMMERCIAL

## 2021-06-14 VITALS
WEIGHT: 132.2 LBS | SYSTOLIC BLOOD PRESSURE: 112 MMHG | DIASTOLIC BLOOD PRESSURE: 64 MMHG | BODY MASS INDEX: 23.42 KG/M2 | HEIGHT: 63 IN

## 2021-06-14 DIAGNOSIS — G47.8 SLEEP PARALYSIS: ICD-10-CM

## 2021-06-14 DIAGNOSIS — G47.419 PRIMARY NARCOLEPSY WITHOUT CATAPLEXY: ICD-10-CM

## 2021-06-14 DIAGNOSIS — Z30.42 ENCOUNTER FOR SURVEILLANCE OF INJECTABLE CONTRACEPTIVE: Primary | ICD-10-CM

## 2021-06-14 PROCEDURE — 95810 POLYSOM 6/> YRS 4/> PARAM: CPT

## 2021-06-14 PROCEDURE — 96372 THER/PROPH/DIAG INJ SC/IM: CPT | Performed by: OBSTETRICS & GYNECOLOGY

## 2021-06-14 RX ORDER — MEDROXYPROGESTERONE ACETATE 150 MG/ML
150 INJECTION, SUSPENSION INTRAMUSCULAR ONCE
Status: COMPLETED | OUTPATIENT
Start: 2021-06-14 | End: 2021-06-14

## 2021-06-14 RX ADMIN — MEDROXYPROGESTERONE ACETATE 150 MG: 150 INJECTION, SUSPENSION INTRAMUSCULAR at 11:29

## 2021-06-14 NOTE — PROGRESS NOTES
Patient presents for depo-provera injection  Area prepped with alcohol swab  Injection given in right deltoid  Patient tolerate well with no complaints  Next injection scheduled before leaving office

## 2021-06-15 ENCOUNTER — HOSPITAL ENCOUNTER (OUTPATIENT)
Dept: SLEEP CENTER | Facility: CLINIC | Age: 21
Discharge: HOME | End: 2021-06-15
Payer: COMMERCIAL

## 2021-06-15 PROCEDURE — 95805 MULTIPLE SLEEP LATENCY TEST: CPT

## 2021-06-15 NOTE — PROGRESS NOTES
Pre-Sleep Study       Sleep testing procedure explained to patient:YES    Urine drug screen performed: No: Provide Reason Not needed    Study Documentation    Patient reports:    Nap: 1: Sleep no Dream no    Nap 2:  Sleep no Dream no    Nap 3:  Sleep no Dream no    Nap 4: Sleep yes Dream no    Nap 5:  Sleep no Dream no    EKG abnormalities: no     EEG abnormalities: no    Naps completed: 5

## 2021-06-15 NOTE — PROGRESS NOTES
Sleep Study Documentation    Pre-Sleep Study       Sleep testing procedure explained to patient:YES    Patient napped prior to study:NO    Caffeine:Dayshift worker after 12PM   Caffeine use:NO    Alcohol:Dayshift workers after 5PM: Alcohol use:NO    Typical day for patient:NO       Study Documentation    Sleep Study Indications: Suspected narcolepsy, Sleep Paralysis, Excessive daytime sleepiness    Sleep Study: Diagnostic   Snore:None  Supplemental O2: no    O2 flow rate (L/min) range   O2 flow rate (L/min) final   Minimum SaO2 93%  Baseline SaO2 99%    EKG abnormalities: no     EEG abnormalities: no    Sleep Study Recorded < 2 hours: N/A    Sleep Study Recorded > 2 hours but incomplete study: N/A    Sleep Study Recorded 6 hours but no sleep obtained: NO    Patient classification:       Post-Sleep Study    Medication used at bedtime or during sleep study:NO    Patient reports time it took to fall asleep:30 to 60 minutes    Patient reports waking up during study:3 or more times  Patient reports returning to sleep without difficulty  Patient reports sleeping 4 to 6 hours without dreaming  Patient reports sleep during study:typical    Patient rated sleepiness: Somewhat sleepy or tired    PAP treatment:no

## 2021-06-23 ENCOUNTER — TELEPHONE (OUTPATIENT)
Dept: SLEEP CENTER | Facility: CLINIC | Age: 21
End: 2021-06-23

## 2021-06-23 NOTE — TELEPHONE ENCOUNTER
Left message for the patient that sleep study is  Resulted   Left details of his follow up appointment with Dr Dillon Dhaliwal in message

## 2021-08-24 ENCOUNTER — OFFICE VISIT (OUTPATIENT)
Dept: SLEEP CENTER | Facility: CLINIC | Age: 21
End: 2021-08-24
Payer: COMMERCIAL

## 2021-08-24 VITALS
HEIGHT: 63 IN | BODY MASS INDEX: 23.42 KG/M2 | DIASTOLIC BLOOD PRESSURE: 70 MMHG | SYSTOLIC BLOOD PRESSURE: 100 MMHG | WEIGHT: 132.2 LBS

## 2021-08-24 DIAGNOSIS — G47.419 PRIMARY NARCOLEPSY WITHOUT CATAPLEXY: Primary | ICD-10-CM

## 2021-08-24 PROCEDURE — 99214 OFFICE O/P EST MOD 30 MIN: CPT | Performed by: INTERNAL MEDICINE

## 2021-08-24 RX ORDER — MEDROXYPROGESTERONE ACETATE 150 MG/ML
INJECTION, SUSPENSION INTRAMUSCULAR
COMMUNITY
Start: 2021-06-14 | End: 2021-09-07 | Stop reason: SDUPTHER

## 2021-08-24 NOTE — PROGRESS NOTES
Progress Note - Sleep Center   Pito Burnett :2000 MRN: [id number]      Reason for Visit:    22 y  o female  with excessive daytime sleepiness    Assessment:   multiple sleep latency testing demonstrated no abnormalities on overnight polysomnography and a normal REM latency  MSLT however was more consistent with narcolepsy, demonstrating an average sleep latency of 8 minutes and one out of five naps with sleep onset REM  The patient's clinical history supports the diagnosis of narcolepsy  Plan:    Start Sunosi 75 mg every morning    Follow up: One month  I also gave the patient and her mother my cell phone number if they need to reach me immediately  History of Present Illness:    Excessive daytime sleepiness  Polysomnography/MSLT as above  The patient has recurrent episodes of sleep paralysis since age 16  She also reports realistic dreams, but denies daytime hallucinations or cataplexy        Review of Systems      Genitourinary need to urinate more than twice a night   Cardiology none   Gastrointestinal none   Neurology none   Constitutional fatigue   Integumentary none   Psychiatry mood change   Musculoskeletal none   Pulmonary shortness of breath with activity   ENT none   Endocrine none   Hematological none           I have reviewed and updated the review of systems as necessary     Historical Information    Past Medical History:   Diagnosis Date    ADHD     Eczematous dermatitis     LAST ASSESSED: 17    Microscopic hematuria     LAST ASSESSED: 17    Vitamin D deficiency     LAST ASSESSED: 17         Past Surgical History:   Procedure Laterality Date    NO PAST SURGERIES           Social History     Socioeconomic History    Marital status: Single     Spouse name: None    Number of children: 0    Years of education: None    Highest education level: None   Occupational History    Occupation: Student   Tobacco Use    Smoking status: Never Smoker    Smokeless tobacco: Never Used   Vaping Use    Vaping Use: Never used   Substance and Sexual Activity    Alcohol use: Not Currently     Alcohol/week: 0 0 standard drinks    Drug use: No    Sexual activity: Not Currently     Partners: Male     Birth control/protection: Injection   Other Topics Concern    None   Social History Narrative    LEARNING DISABILITIES     Social Determinants of Health     Financial Resource Strain:     Difficulty of Paying Living Expenses:    Food Insecurity:     Worried About Running Out of Food in the Last Year:     Ran Out of Food in the Last Year:    Transportation Needs:     Lack of Transportation (Medical):      Lack of Transportation (Non-Medical):    Physical Activity:     Days of Exercise per Week:     Minutes of Exercise per Session:    Stress:     Feeling of Stress :    Social Connections:     Frequency of Communication with Friends and Family:     Frequency of Social Gatherings with Friends and Family:     Attends Adventism Services:     Active Member of Clubs or Organizations:     Attends Club or Organization Meetings:     Marital Status:    Intimate Partner Violence:     Fear of Current or Ex-Partner:     Emotionally Abused:     Physically Abused:     Sexually Abused:            History   Alcohol use: Not on file       History   Smoking Status    Not on file   Smokeless Tobacco    Not on file       Family History:   Family History   Problem Relation Age of Onset    Sick sinus syndrome Paternal Grandmother         TACHYCARDIA    Alcohol abuse Mother     Hypertension Paternal Grandfather     Stroke Paternal Grandfather     Hyperlipidemia Paternal Grandfather     No Known Problems Sister     No Known Problems Brother     Diabetes Maternal Grandmother     Alcohol abuse Maternal Grandmother         stong history of alcoholism on mom's side of family     No Known Problems Maternal Grandfather        Medications/Allergies:      Current Outpatient Medications:   amphetamine-dextroamphetamine (ADDERALL) 5 MG tablet, Take 1 tablet (5 mg total) by mouth 2 (two) times a dayMax Daily Amount: 10 mg, Disp: 60 tablet, Rfl: 0    cholecalciferol (VITAMIN D3) 1,000 units tablet, Take 1 tablet (1,000 Units total) by mouth daily Over the counter, Disp: , Rfl:     medroxyPROGESTERone acetate (DEPO-PROVERA SYRINGE) 150 mg/mL injection, , Disp: , Rfl:       Objective    Vital Signs:   Vitals:    08/24/21 1335   BP: 100/70   Weight: 60 kg (132 lb 3 2 oz)   Height: 5' 3" (1 6 m)     [place] Sleepiness Scale: Total score: 13    Physical Exam:    General: Alert, appropriate, cooperative, overweight    Head: NC/AT    Skin: Warm, dry    Neuro: No motor abnormalities, cranial nerves appear intact    Psych: Normal affect            VILMA López    Board Certified Sleep Specialist

## 2021-08-26 ENCOUNTER — TELEPHONE (OUTPATIENT)
Dept: SLEEP CENTER | Facility: CLINIC | Age: 21
End: 2021-08-26

## 2021-08-26 DIAGNOSIS — G47.33 OSA (OBSTRUCTIVE SLEEP APNEA): Primary | ICD-10-CM

## 2021-08-26 DIAGNOSIS — G47.10 HYPERSOMNIA: ICD-10-CM

## 2021-08-26 NOTE — TELEPHONE ENCOUNTER
Patient's mother left voice mail stating yesterday in the office, Dr Zackery Andrew was to order patient a medication, but when they went to the pharmacy there was no medication there  Dr Zackery Andrew, per office note you were to start the patient on Sunosi 75mg daily  Send to [facility]  I teed up script  Please review and sign if appropriate

## 2021-08-30 ENCOUNTER — TELEPHONE (OUTPATIENT)
Dept: SLEEP CENTER | Facility: CLINIC | Age: 21
End: 2021-08-30

## 2021-08-31 ENCOUNTER — OFFICE VISIT (OUTPATIENT)
Dept: FAMILY MEDICINE CLINIC | Facility: CLINIC | Age: 21
End: 2021-08-31
Payer: COMMERCIAL

## 2021-08-31 VITALS
HEIGHT: 63 IN | OXYGEN SATURATION: 97 % | SYSTOLIC BLOOD PRESSURE: 118 MMHG | RESPIRATION RATE: 18 BRPM | BODY MASS INDEX: 23.42 KG/M2 | HEART RATE: 108 BPM | WEIGHT: 132.2 LBS | TEMPERATURE: 98 F | DIASTOLIC BLOOD PRESSURE: 74 MMHG

## 2021-08-31 DIAGNOSIS — F98.8 ATTENTION DEFICIT DISORDER (ADD) WITHOUT HYPERACTIVITY: Primary | ICD-10-CM

## 2021-08-31 DIAGNOSIS — G47.419 PRIMARY NARCOLEPSY WITHOUT CATAPLEXY: ICD-10-CM

## 2021-08-31 PROCEDURE — 99213 OFFICE O/P EST LOW 20 MIN: CPT | Performed by: FAMILY MEDICINE

## 2021-08-31 NOTE — PROGRESS NOTES
Assessment/Plan:     Problem List Items Addressed This Visit        Unprioritized    Attention deficit disorder (ADD) without hyperactivity - Primary    Primary narcolepsy without cataplexy      see ASRS scale   May take adderall XR 10 mg until she starts a medicine for narcolepsy  Once she starts a medicine for narcolepsy she is to stop any ADD meds  Anxiety at at goal - recommend counseling   If not helping, or too long of a wait, consider meds  Can try counselor at [facility] NINA QUEZADA also     No follow-ups on file  Subjective:   Staci Wallace is a 22 y o  female here today for a follow-up on her current medical conditions:  Patient Active Problem List   Diagnosis    Attention deficit disorder (ADD) without hyperactivity    Irregular menstrual cycle    Vitamin D deficiency    Dysmenorrhea    ISABELA (obstructive sleep apnea)    Primary narcolepsy without cataplexy        Current Medications:  Current Outpatient Medications   Medication Sig Dispense Refill    cholecalciferol (VITAMIN D3) 1,000 units tablet Take 1 tablet (1,000 Units total) by mouth daily Over the counter      medroxyPROGESTERone acetate (DEPO-PROVERA SYRINGE) 150 mg/mL injection       solriamfetol (SUNOSI) 75 mg tablet Take 1 tablet (75 mg total) by mouth daily Avoid taking within 9 hours of bedtime to prevent interference with sleep 30 tablet 3     No current facility-administered medications for this visit  HPI:  Chief Complaint   Patient presents with    ADHD     medication f/u    Medication Management     started on new medicaiton by sleep doctor    Medication Refill     none    Care Gap Submission     none     -- Above per clinical staff and reviewed  --    PHQ-9 Depression Screening    PHQ-9:   Frequency of the following problems over the past two weeks:                vit d 19 to 72 on rx level - 6 mos ago        siblings Rose Marie August  labs Nov 2020 normal other than low vit D 19 - rx started   - 6 mos later 72 (on 1000 otc)   sleep med saw pt 8/24/21 dx her with narcolepsy  started Sunosi 75 mg   last visit with me a  dderall 5 mg bid was started   thingks maybe this was helping her  Not feeling down but a lot of changes and feelign overwhelmed  Felt liek she would throw up and wsa sweating like she had run  Feels a lot of anxiety with school   Went on psychology today - hard to find someone    Today:  yesterday was first day of school  Was not able to retain information   Not taking adderrall because seh is to start sunosi   Found that other XR Adderall worked better    The following portions of the patient's history were reviewed and updated as appropriate: allergies, current medications, past family history, past medical history, past social history, past surgical history and problem list     Objective:  Vitals:  /74   Pulse (!) 108   Temp 98 °F (36 7 °C) (Temporal)   Resp 18   Ht 5' 3" (1 6 m)   Wt 60 kg (132 lb 3 2 oz)   SpO2 97%   BMI 23 42 kg/m²    Wt Readings from Last 3 Encounters:   08/31/21 60 kg (132 lb 3 2 oz)   08/24/21 60 kg (132 lb 3 2 oz)   06/14/21 60 kg (132 lb 3 2 oz) (57 %, Z= 0 17)*     * Growth percentiles are based on CDC (Girls, 2-20 Years) data  BP Readings from Last 3 Encounters:   08/31/21 118/74   08/24/21 100/70   06/14/21 112/64        Review of Systems   She has no other concerns  No unexpected weight changes  No chest pain, SOB, or palpitations  No GERD  No changes in bowels or bladder  Sleeping well  + mood changes  Physical Exam   Constitutional:  she appears well-developed and well-nourished  HENT: Head: Normocephalic  Neck: Neck supple  Cardiovascular: Normal rate, regular rhythm and normal heart sounds  Pulmonary/Chest: Effort normal and breath sounds normal  No wheezes, rales, or rhonchi  Abdominal: Soft  Bowel sounds are normal  There is no tenderness  No hepatosplenomegaly  Musculoskeletal: she exhibits no edema  Lymphadenopathy: she has no cervical adenopathy  Neurological: she is alert and oriented to person, place, and time  Skin: Skin is warm and dry  Psychiatric: she has a normal mood and affect   her behavior is normal  Thought content normal

## 2021-08-31 NOTE — PATIENT INSTRUCTIONS
Counseling services:    [facility]   [address], [address] , [place], [address] (they have equine therapy too)  8 [facility], [facility] [address],  [place], [address] [facility] [address], [address],  Theador Stammer, [address]    70 [facility], [address] [place] [address] Kofi   [phone]     [address]  [address] [facility] [place], [address]  [facility]    1135 [facility]   [address], [facility], [address]     [phone]     1065 [facility]   [address] [facility], [facility], [address]     Loulou Sue, MSW, LSW  (patients age 11-adult)   [address], 243 [facility] [place], [address]   D R  [employer] [facility], [address] [place] [address] Ne  1000 [facility]note 2], 17[address] [facility] 2131 [facility], [facility], [place], [address] (specializing in addiction)  [facility], [address] [facility], [address]  1441 [facility]  291 [facility], [place], 6100 [facility]   [place]      [address], [address] [facility] , [address], [place], [address], [place], [facility], 2121 [facility]  100 [facility], [address], [place], [address]  [address] [address], 2601 [facility], [address] [facility], [address]   [phone]    [place], [address]  [place] [facility] [place], [address] Ves[address] [id number] [phone]      Hotlines:   Please program these numbers into your phone in case you or someone you know needs them  All services are free  WarmLine:  114.831.5821 or 369-269-4175   They provide a supportive listening ear if you need it  They also can also provide information about mental health concerns and services     Crisis Line:  [facility] [place] [phone],  [facility] [phone], 250 [facility] and [place]: [phone]   24/7 crisis counseling, on-site counseling and walk-in counseling services available  National Suicide Prevention Lifeline:  5-330-838-577-669-6651  En 1200 [facility] [phone]   This is free, confidential, and available 24/7  [place]: [phone]   For those facing or having survived abuse 24 hour confidential help line, emergency safe house, counseling, advocacy and education  Crisis Text Line: text 477531     You are connected to a Crisis Counselor to bring a hot moment to a cool calm through active listening and collaborative problem solving  If you or someone your know are feeling as though you are going to hurt yourself, do not wait - GET HELP RIGHT AWAY  Go to the closest Emergency Room, call 911, or call someone you trust, family member or friend to be with you until you can get some help

## 2021-09-01 NOTE — TELEPHONE ENCOUNTER
Received call from patient  Advised of denial for [facility] and Dr Jimmie Haile recommendation for savings card  I advised patient to call her pharmacy to see if they have savings cards and if not she can stop by our office for one  Patient agreeable

## 2021-09-02 NOTE — TELEPHONE ENCOUNTER
Patients mom left message, states the pharmacist advised that since insurance denied they will have to pay $450 with the savings card  Mom would like to try another medication that will be covered  Modafinil or Armodafinil  I spoke with pharmacist, states that since insurance denied the savings card will only cover what insurance does not cover      Please advise

## 2021-09-02 NOTE — TELEPHONE ENCOUNTER
I spoke with [facility], advised that Dr Nahed Tang spoke with [facility]: because of patients high deductible plan, they will be responsible for the balance of the RX cost that falls between the retail pric eand the amount the savings card will cover, until the deductible is met  Once the deductible is met the copay should be closer to the $9  Also the patient angus see if they are financially eligible for the patient assistance program     [facility] states that the pharmacist said that she ran the card with out the insurance and she said that the patient will have to pay $450  [facility] does not think she will be eligible for patient assistance program     I asked if she would like me to see if Dr Nahed Tang would order Armodafinil or Modafinil? She states she does not want to tell the doctor what to order  Please advise on how you would like to proceed

## 2021-09-07 DIAGNOSIS — Z30.42 ENCOUNTER FOR SURVEILLANCE OF INJECTABLE CONTRACEPTIVE: Primary | ICD-10-CM

## 2021-09-07 RX ORDER — MEDROXYPROGESTERONE ACETATE 150 MG/ML
150 INJECTION, SUSPENSION INTRAMUSCULAR
Qty: 1 ML | Refills: 3 | Status: SHIPPED | OUTPATIENT
Start: 2021-09-07 | End: 2021-12-07 | Stop reason: ALTCHOICE

## 2021-09-09 NOTE — TELEPHONE ENCOUNTER
The patient's insurance would not cover [address] [facility]  I will try armodafinil  150 mg by mouth every morning  I discussed the side effects with the patient, including inactivation of birth control hormones

## 2021-09-14 ENCOUNTER — CLINICAL SUPPORT (OUTPATIENT)
Dept: OBGYN CLINIC | Facility: CLINIC | Age: 21
End: 2021-09-14
Payer: COMMERCIAL

## 2021-09-14 VITALS — DIASTOLIC BLOOD PRESSURE: 62 MMHG | BODY MASS INDEX: 23.91 KG/M2 | WEIGHT: 135 LBS | SYSTOLIC BLOOD PRESSURE: 110 MMHG

## 2021-09-14 DIAGNOSIS — Z30.42 ENCOUNTER FOR SURVEILLANCE OF INJECTABLE CONTRACEPTIVE: Primary | ICD-10-CM

## 2021-09-14 PROCEDURE — 96372 THER/PROPH/DIAG INJ SC/IM: CPT | Performed by: OBSTETRICS & GYNECOLOGY

## 2021-09-14 RX ORDER — MEDROXYPROGESTERONE ACETATE 150 MG/ML
150 INJECTION, SUSPENSION INTRAMUSCULAR ONCE
Status: COMPLETED | OUTPATIENT
Start: 2021-09-14 | End: 2021-09-14

## 2021-09-14 RX ADMIN — MEDROXYPROGESTERONE ACETATE 150 MG: 150 INJECTION, SUSPENSION INTRAMUSCULAR at 13:19

## 2021-11-30 ENCOUNTER — OFFICE VISIT (OUTPATIENT)
Dept: SLEEP CENTER | Facility: CLINIC | Age: 21
End: 2021-11-30
Payer: COMMERCIAL

## 2021-11-30 ENCOUNTER — TELEPHONE (OUTPATIENT)
Dept: SLEEP CENTER | Facility: CLINIC | Age: 21
End: 2021-11-30

## 2021-11-30 VITALS
DIASTOLIC BLOOD PRESSURE: 60 MMHG | WEIGHT: 140.8 LBS | HEART RATE: 74 BPM | HEIGHT: 63 IN | BODY MASS INDEX: 24.95 KG/M2 | SYSTOLIC BLOOD PRESSURE: 110 MMHG

## 2021-11-30 DIAGNOSIS — G47.419 NARCOLEPSY WITHOUT CATAPLEXY: Primary | ICD-10-CM

## 2021-11-30 PROCEDURE — 99213 OFFICE O/P EST LOW 20 MIN: CPT | Performed by: INTERNAL MEDICINE

## 2021-12-06 DIAGNOSIS — Z30.42 ENCOUNTER FOR SURVEILLANCE OF INJECTABLE CONTRACEPTIVE: ICD-10-CM

## 2021-12-06 RX ORDER — MEDROXYPROGESTERONE ACETATE 150 MG/ML
150 INJECTION, SUSPENSION INTRAMUSCULAR
Qty: 1 ML | Refills: 0 | Status: CANCELLED | OUTPATIENT
Start: 2021-12-06

## 2021-12-07 ENCOUNTER — CLINICAL SUPPORT (OUTPATIENT)
Dept: OBGYN CLINIC | Facility: CLINIC | Age: 21
End: 2021-12-07
Payer: COMMERCIAL

## 2021-12-07 VITALS — DIASTOLIC BLOOD PRESSURE: 60 MMHG | SYSTOLIC BLOOD PRESSURE: 112 MMHG | BODY MASS INDEX: 24.8 KG/M2 | WEIGHT: 140 LBS

## 2021-12-07 DIAGNOSIS — Z30.42 ENCOUNTER FOR MANAGEMENT AND INJECTION OF DEPO-PROVERA: Primary | ICD-10-CM

## 2021-12-07 PROCEDURE — 96372 THER/PROPH/DIAG INJ SC/IM: CPT | Performed by: OBSTETRICS & GYNECOLOGY

## 2021-12-07 RX ORDER — MEDROXYPROGESTERONE ACETATE 150 MG/ML
150 INJECTION, SUSPENSION INTRAMUSCULAR
Status: SHIPPED | OUTPATIENT
Start: 2021-12-07 | End: 2022-12-02

## 2021-12-07 RX ADMIN — MEDROXYPROGESTERONE ACETATE 150 MG: 150 INJECTION, SUSPENSION INTRAMUSCULAR at 11:58

## 2021-12-16 ENCOUNTER — TELEPHONE (OUTPATIENT)
Dept: SLEEP CENTER | Facility: CLINIC | Age: 21
End: 2021-12-16

## 2021-12-23 ENCOUNTER — TELEMEDICINE (OUTPATIENT)
Dept: FAMILY MEDICINE CLINIC | Facility: CLINIC | Age: 21
End: 2021-12-23
Payer: COMMERCIAL

## 2021-12-23 VITALS — BODY MASS INDEX: 24.8 KG/M2 | TEMPERATURE: 98.5 F | WEIGHT: 140 LBS | HEIGHT: 63 IN

## 2021-12-23 DIAGNOSIS — B34.9 VIRAL INFECTION, UNSPECIFIED: Primary | ICD-10-CM

## 2021-12-23 PROCEDURE — 87636 SARSCOV2 & INF A&B AMP PRB: CPT | Performed by: FAMILY MEDICINE

## 2021-12-23 PROCEDURE — 3008F BODY MASS INDEX DOCD: CPT | Performed by: FAMILY MEDICINE

## 2021-12-23 PROCEDURE — 99213 OFFICE O/P EST LOW 20 MIN: CPT | Performed by: FAMILY MEDICINE

## 2022-01-02 ENCOUNTER — OFFICE VISIT (OUTPATIENT)
Dept: URGENT CARE | Age: 22
End: 2022-01-02
Payer: COMMERCIAL

## 2022-01-02 VITALS — HEART RATE: 108 BPM | OXYGEN SATURATION: 98 % | RESPIRATION RATE: 20 BRPM | TEMPERATURE: 100.1 F

## 2022-01-02 DIAGNOSIS — J06.9 UPPER RESPIRATORY TRACT INFECTION, UNSPECIFIED TYPE: ICD-10-CM

## 2022-01-02 DIAGNOSIS — Z20.822 CONTACT WITH AND (SUSPECTED) EXPOSURE TO COVID-19: Primary | ICD-10-CM

## 2022-01-02 LAB — S PYO AG THROAT QL: NEGATIVE

## 2022-01-02 PROCEDURE — U0003 INFECTIOUS AGENT DETECTION BY NUCLEIC ACID (DNA OR RNA); SEVERE ACUTE RESPIRATORY SYNDROME CORONAVIRUS 2 (SARS-COV-2) (CORONAVIRUS DISEASE [COVID-19]), AMPLIFIED PROBE TECHNIQUE, MAKING USE OF HIGH THROUGHPUT TECHNOLOGIES AS DESCRIBED BY CMS-2020-01-R: HCPCS | Performed by: NURSE PRACTITIONER

## 2022-01-02 PROCEDURE — 99213 OFFICE O/P EST LOW 20 MIN: CPT | Performed by: NURSE PRACTITIONER

## 2022-01-02 PROCEDURE — 87070 CULTURE OTHR SPECIMN AEROBIC: CPT | Performed by: NURSE PRACTITIONER

## 2022-01-02 PROCEDURE — U0005 INFEC AGEN DETEC AMPLI PROBE: HCPCS | Performed by: NURSE PRACTITIONER

## 2022-01-02 PROCEDURE — 87880 STREP A ASSAY W/OPTIC: CPT | Performed by: NURSE PRACTITIONER

## 2022-01-02 NOTE — PROGRESS NOTES
[facility]        NAME: Bill Ramires is a 22 y o  female  : 2000    MRN: [id number]  DATE: 2022  TIME: 9:17 AM    Assessment and Plan   Contact with and (suspected) exposure to covid-19 [Z20 822]  1  Contact with and (suspected) exposure to covid-19  COVID Only -Office Collect   2  Upper respiratory tract infection, unspecified type  POCT rapid strepA    Throat culture         Patient Instructions     Rapid strep is negative; will send for culture  Covid tested; results in 1-2 days  Stay quarantined   Follow up with PCP in 3-5 days  Proceed to  ER if symptoms worsen  Chief Complaint     Chief Complaint   Patient presents with    Fever     pt states started with symptoms 5 days ago, exposed to family with covid, pt is vaccinated    Sore Throat    Headache    Diarrhea         History of Present Illness       HPI   Reports exposure to sister who has been diagnosed with covid 23  Current symptoms include sore throat, fever, and HA  Had diarrhea but that resolved  Requesting testing for covid    Review of Systems   Review of Systems   Constitutional: Positive for fever  HENT: Positive for congestion, rhinorrhea and sore throat  Respiratory: Negative for cough, chest tightness, shortness of breath and wheezing  Cardiovascular: Negative for chest pain  Gastrointestinal: Negative for diarrhea and vomiting  Neurological: Positive for headaches           Current Medications       Current Outpatient Medications:     cholecalciferol (VITAMIN D3) 1,000 units tablet, Take 1 tablet (1,000 Units total) by mouth daily Over the counter (Patient not taking: Reported on 2021 ), Disp: , Rfl:     Current Facility-Administered Medications:     medroxyPROGESTERone acetate (DEPO-PROVERA SYRINGE) IM injection 150 mg, 150 mg, Intramuscular, Q3 Months, Quentin Hayes PA-C, 150 mg at 21 1158    Current Allergies     Allergies as of 2022    (No Known Allergies) The following portions of the patient's history were reviewed and updated as appropriate: allergies, current medications, past family history, past medical history, past social history, past surgical history and problem list      Past Medical History:   Diagnosis Date    ADHD     Eczematous dermatitis     LAST ASSESSED: 11/16/17    Microscopic hematuria     LAST ASSESSED: 8/17/17    Narcolepsy     Vitamin D deficiency     LAST ASSESSED: 8/17/17       Past Surgical History:   Procedure Laterality Date    NO PAST SURGERIES         Family History   Problem Relation Age of Onset    Sick sinus syndrome Paternal Grandmother         TACHYCARDIA    Alcohol abuse Mother     Hypertension Paternal Grandfather     Stroke Paternal Grandfather     Hyperlipidemia Paternal Grandfather     No Known Problems Sister     No Known Problems Brother     Diabetes Maternal Grandmother     Alcohol abuse Maternal Grandmother         stong history of alcoholism on mom's side of family     No Known Problems Maternal Grandfather          Medications have been verified  Objective   Pulse (!) 108   Temp 100 1 °F (37 8 °C)   Resp 20   SpO2 98%   No LMP recorded  (Menstrual status: Birth Control)  Physical Exam     Physical Exam  Constitutional:       Appearance: She is not ill-appearing or diaphoretic  HENT:      Right Ear: Tympanic membrane and ear canal normal       Left Ear: Tympanic membrane and ear canal normal       Nose: Rhinorrhea present  Mouth/Throat:      Mouth: Mucous membranes are moist       Pharynx: No pharyngeal swelling or posterior oropharyngeal erythema  Tonsils: No tonsillar exudate  2+ on the right  2+ on the left  Comments: Nasal drip  Cardiovascular:      Rate and Rhythm: Regular rhythm  Pulmonary:      Effort: Pulmonary effort is normal       Breath sounds: Normal breath sounds

## 2022-01-03 LAB — SARS-COV-2 RNA RESP QL NAA+PROBE: POSITIVE

## 2022-01-04 LAB — BACTERIA THROAT CULT: NORMAL

## 2022-01-11 ENCOUNTER — TELEPHONE (OUTPATIENT)
Dept: SLEEP CENTER | Facility: CLINIC | Age: 22
End: 2022-01-11

## 2022-01-11 NOTE — TELEPHONE ENCOUNTER
Received letter from [facility]  States patient and/or caregiver has not responded to attempts to complete counseling required prior to receiving a shipment of Xywav or Xyrem  Patient case has been closed

## 2022-02-16 ENCOUNTER — TELEPHONE (OUTPATIENT)
Dept: SLEEP CENTER | Facility: CLINIC | Age: 22
End: 2022-02-16

## 2022-02-16 NOTE — TELEPHONE ENCOUNTER
Received message from Raleigh Urena at [address] [place] [address]  States counseling call was completed with patient  Medication was originally prescribed in December but was never shipped since counseling call was not completed  [place] not completed  Per Raleigh Urena, during counseling call the patient admitted to occasionally drinking alcohol  She is underage and will not turn 22 until 6/24/22  Raleigh Urena counseled the patient on the effects of alcohol when taken with Xywav  Patient agreed to stop drinking alcohol  [facility] needs confirmation that Dr Iker Marley is aware of above and is agreeable for medication to be shipped  Dr Iker Marley, please advise

## 2022-02-22 ENCOUNTER — ANNUAL EXAM (OUTPATIENT)
Dept: OBGYN CLINIC | Facility: CLINIC | Age: 22
End: 2022-02-22
Payer: COMMERCIAL

## 2022-02-22 VITALS
BODY MASS INDEX: 24.63 KG/M2 | WEIGHT: 139 LBS | DIASTOLIC BLOOD PRESSURE: 62 MMHG | SYSTOLIC BLOOD PRESSURE: 110 MMHG | HEIGHT: 63 IN

## 2022-02-22 DIAGNOSIS — Z01.419 ENCOUNTER FOR WELL WOMAN EXAM WITH ROUTINE GYNECOLOGICAL EXAM: Primary | ICD-10-CM

## 2022-02-22 DIAGNOSIS — N92.6 IRREGULAR MENSTRUAL CYCLE: ICD-10-CM

## 2022-02-22 DIAGNOSIS — N94.6 DYSMENORRHEA: ICD-10-CM

## 2022-02-22 PROCEDURE — S0612 ANNUAL GYNECOLOGICAL EXAMINA: HCPCS | Performed by: OBSTETRICS & GYNECOLOGY

## 2022-02-22 RX ORDER — MEDROXYPROGESTERONE ACETATE 150 MG/ML
150 INJECTION, SUSPENSION INTRAMUSCULAR
Qty: 1 ML | Refills: 1 | Status: SHIPPED | OUTPATIENT
Start: 2022-02-22

## 2022-02-22 NOTE — PROGRESS NOTES
ASSESSMENT & PLAN:   Diagnoses and all orders for this visit:    Encounter for well woman exam with routine gynecological exam  -     medroxyPROGESTERone (DEPO-PROVERA) 150 mg/mL injection; Inject 1 mL (150 mg total) into a muscle every 3 (three) months    Dysmenorrhea  -     medroxyPROGESTERone (DEPO-PROVERA) 150 mg/mL injection; Inject 1 mL (150 mg total) into a muscle every 3 (three) months    Irregular menstrual cycle  -     medroxyPROGESTERone (DEPO-PROVERA) 150 mg/mL injection; Inject 1 mL (150 mg total) into a muscle every 3 (three) months      Discussed BTB and irregular spotting that is occurring with depo  Likely secondary to thin endometrial lining from prolonged progesterone effect  Reviewed different types of hormonal medication that can help to regulate her menses as well as 3 month packs that will cause her to have a menses every 3 months if her menses are painful and she prefers to avoid them  Informational packet provided to patient for further education  Rx provided for depo  Patient will monitor her bleeding during the next 3 months and if she desires to switch, she will return for discussion  The following were reviewed in today's visit: ASCCP guidelines (Pap screening at age 24), Gardisil vaccination, STD testing breast self exam, use and side effects of OCPs, family planning choices, exercise and healthy diet  Patient to return to office in yearly for annual exam      All questions have been answered to her satisfaction  CC:  Annual Gynecologic Examination  Chief Complaint   Patient presents with    Gynecologic Exam     Pt is here for her annual exam; declines exam  Pt will be back in for her Depo next week as she needs refills  HPI: Ankita Frazier is a 22 y o  Keri Zelaya who presents for annual gynecologic examination  She has the following concerns:  Having more spotting occurring daily for a whole month now with depo provera   She denies heavy bleeding and reports 2 out of 3 months she does not have bleeding  Not currently sexually active  Health Maintenance:    Exercise: frequently  Breast exams/breast awareness: yes  Diet: well balanced diet      Past Medical History:   Diagnosis Date    ADHD     Eczematous dermatitis     LAST ASSESSED: 11/16/17    Microscopic hematuria     LAST ASSESSED: 8/17/17    Narcolepsy     Vitamin D deficiency     LAST ASSESSED: 8/17/17       Past Surgical History:   Procedure Laterality Date    NO PAST SURGERIES         Past OB/Gyn History:   Patient's last menstrual period was 01/04/2022 (approximate)  Pap not indicated, start screening at age 24  HPV vaccine completed: Yes    Patient is not currently sexually active     STD testing: no  Current contraception: Depo-Provera injections      Family History  Family History   Problem Relation Age of Onset    Sick sinus syndrome Paternal Grandmother         TACHYCARDIA    Alcohol abuse Mother     Hypertension Paternal Grandfather     Stroke Paternal Grandfather     Hyperlipidemia Paternal Grandfather     No Known Problems Sister     No Known Problems Brother     Diabetes Maternal Grandmother     Alcohol abuse Maternal Grandmother         stong history of alcoholism on mom's side of family     No Known Problems Maternal Grandfather        Family history of uterine or ovarian cancer: no  Family history of breast cancer: no  Family history of colon cancer: no    Social History:  Social History     Socioeconomic History    Marital status: Single     Spouse name: Not on file    Number of children: 0    Years of education: Not on file    Highest education level: Not on file   Occupational History    Occupation: Student   Tobacco Use    Smoking status: Never Smoker    Smokeless tobacco: Never Used   Vaping Use    Vaping Use: Some days    Start date: 6/30/2021    Substances: Nicotine, Flavoring   Substance and Sexual Activity    Alcohol use: Not Currently     Alcohol/week: 0 0 standard drinks    Drug use: Never    Sexual activity: Not Currently     Birth control/protection: Injection   Other Topics Concern    Not on file   Social History Narrative    LEARNING DISABILITIES     Social Determinants of Health     Financial Resource Strain: Not on file   Food Insecurity: Not on file   Transportation Needs: Not on file   Physical Activity: Not on file   Stress: Not on file   Social Connections: Not on file   Intimate Partner Violence: Not on file   Housing Stability: Not on file     Domestic violence screen: negative      Allergies:  No Known Allergies    Medications:    Current Outpatient Medications:     cholecalciferol (VITAMIN D3) 1,000 units tablet, Take 1 tablet (1,000 Units total) by mouth daily Over the counter, Disp: , Rfl:     medroxyPROGESTERone (DEPO-PROVERA) 150 mg/mL injection, Inject 1 mL (150 mg total) into a muscle every 3 (three) months, Disp: 1 mL, Rfl: 1    Current Facility-Administered Medications:     medroxyPROGESTERone acetate (DEPO-PROVERA SYRINGE) IM injection 150 mg, 150 mg, Intramuscular, Q3 Months, Tony Greenberg PA-C, 150 mg at 12/07/21 1158    Review of Systems:  Review of Systems   Constitutional: Negative for activity change, appetite change and unexpected weight change  Respiratory: Negative for cough and shortness of breath  Cardiovascular: Negative for chest pain  Gastrointestinal: Negative for abdominal pain, constipation, diarrhea, nausea and vomiting  Genitourinary: Positive for menstrual problem and vaginal bleeding  Negative for difficulty urinating, dyspareunia, frequency, pelvic pain, urgency, vaginal discharge and vaginal pain  Musculoskeletal: Negative for back pain  Skin: Negative  Neurological: Negative for dizziness, weakness, light-headedness and headaches  Psychiatric/Behavioral: Negative            Physical Exam:  /62 (BP Location: Right arm, Patient Position: Sitting, Cuff Size: Large)   Ht 5' 3" (1 6 m)   Wt 63 kg (139 lb)   LMP 01/04/2022 (Approximate)   BMI 24 62 kg/m²    Physical Exam  Constitutional:       General: She is not in acute distress  Appearance: Normal appearance  She is normal weight  She is not diaphoretic  HENT:      Head: Normocephalic and atraumatic  Neck:      Thyroid: No thyroid tenderness  Trachea: No tracheal deviation  Cardiovascular:      Rate and Rhythm: Normal rate and regular rhythm  Pulses: Normal pulses  Heart sounds: Normal heart sounds  No murmur heard  Pulmonary:      Effort: Pulmonary effort is normal  No respiratory distress  Breath sounds: Normal breath sounds  No wheezing, rhonchi or rales  Abdominal:      General: Abdomen is flat  Bowel sounds are normal       Palpations: Abdomen is soft  Tenderness: There is no abdominal tenderness  There is no guarding or rebound  Musculoskeletal:      Right lower leg: No edema  Left lower leg: No edema  Neurological:      Mental Status: She is alert and oriented to person, place, and time  Skin:     General: Skin is warm and dry  Coloration: Skin is not pale  Psychiatric:         Mood and Affect: Mood normal          Behavior: Behavior normal          Thought Content: Thought content normal          Judgment: Judgment normal    Vitals and nursing note reviewed

## 2022-02-23 NOTE — TELEPHONE ENCOUNTER
Called [facility] pharmacist left voicemail  that Dr Vira Cowan is aware of patient's alcohol use and it is okay to ship [place]

## 2022-02-28 ENCOUNTER — CLINICAL SUPPORT (OUTPATIENT)
Dept: OBGYN CLINIC | Facility: CLINIC | Age: 22
End: 2022-02-28
Payer: COMMERCIAL

## 2022-02-28 VITALS — WEIGHT: 140.6 LBS | DIASTOLIC BLOOD PRESSURE: 60 MMHG | SYSTOLIC BLOOD PRESSURE: 104 MMHG | BODY MASS INDEX: 24.91 KG/M2

## 2022-02-28 DIAGNOSIS — Z30.42 ENCOUNTER FOR SURVEILLANCE OF INJECTABLE CONTRACEPTIVE: ICD-10-CM

## 2022-02-28 PROCEDURE — 96372 THER/PROPH/DIAG INJ SC/IM: CPT

## 2022-02-28 NOTE — PROGRESS NOTES
Depo-Provera        Patient provided box    Last  Annual Date / Birth control check : 2/22/22   Last Depo date: 12/7/21   Side effects: no   Given by: Bre CASTRO   Site: Left Deltoid

## 2022-06-29 NOTE — PROGRESS NOTES
Depo-Provera       Patient provided box   Last  Annual Date / Birth control check : 2/22/21   Last Depo date: 6/14/21   Side effects: no   Given by: Bre CASTRO   Site: Left Deltoid   Next inj scheduled for 12/7/21 Yes

## 2022-12-30 ENCOUNTER — HOSPITAL ENCOUNTER (EMERGENCY)
Facility: HOSPITAL | Age: 22
Discharge: HOME | End: 2022-12-30
Attending: EMERGENCY MEDICINE

## 2022-12-30 VITALS
BODY MASS INDEX: 27.09 KG/M2 | TEMPERATURE: 97.6 F | RESPIRATION RATE: 20 BRPM | HEART RATE: 69 BPM | WEIGHT: 152.9 LBS | DIASTOLIC BLOOD PRESSURE: 62 MMHG | SYSTOLIC BLOOD PRESSURE: 111 MMHG | OXYGEN SATURATION: 100 %

## 2022-12-30 DIAGNOSIS — R11.2 NAUSEA & VOMITING: Primary | ICD-10-CM

## 2022-12-30 LAB
ALBUMIN SERPL BCP-MCNC: 4.5 G/DL (ref 3.5–5)
ALP SERPL-CCNC: 99 U/L (ref 43–122)
ALT SERPL W P-5'-P-CCNC: 8 U/L
ANION GAP SERPL CALCULATED.3IONS-SCNC: 11 MMOL/L (ref 5–14)
AST SERPL W P-5'-P-CCNC: 23 U/L (ref 14–36)
BACTERIA UR QL AUTO: ABNORMAL /HPF
BASOPHILS # BLD MANUAL: 0 THOUSAND/UL (ref 0–0.1)
BASOPHILS NFR MAR MANUAL: 0 % (ref 0–1)
BILIRUB SERPL-MCNC: 0.59 MG/DL (ref 0.2–1)
BILIRUB UR QL STRIP: NEGATIVE
BUN SERPL-MCNC: 12 MG/DL (ref 5–25)
CALCIUM SERPL-MCNC: 9.2 MG/DL (ref 8.4–10.2)
CHLORIDE SERPL-SCNC: 108 MMOL/L (ref 96–108)
CLARITY UR: CLEAR
CO2 SERPL-SCNC: 25 MMOL/L (ref 21–32)
COLOR UR: YELLOW
CREAT SERPL-MCNC: 0.88 MG/DL (ref 0.6–1.2)
EOSINOPHIL # BLD MANUAL: 0 THOUSAND/UL (ref 0–0.4)
EOSINOPHIL NFR BLD MANUAL: 0 % (ref 0–6)
ERYTHROCYTE [DISTWIDTH] IN BLOOD BY AUTOMATED COUNT: 12.6 % (ref 11.6–15.1)
ETHANOL SERPL-MCNC: 13 MG/DL (ref 0–10)
EXT PREGNANCY TEST URINE: NEGATIVE
EXT. CONTROL: NORMAL
GFR SERPL CREATININE-BSD FRML MDRD: 94 ML/MIN/1.73SQ M
GLUCOSE SERPL-MCNC: 117 MG/DL (ref 70–99)
GLUCOSE UR STRIP-MCNC: NEGATIVE MG/DL
HCG SERPL QL: NEGATIVE
HCT VFR BLD AUTO: 37.7 % (ref 34.8–46.1)
HGB BLD-MCNC: 13.1 G/DL (ref 11.5–15.4)
HGB UR QL STRIP.AUTO: NEGATIVE
KETONES UR STRIP-MCNC: NEGATIVE MG/DL
LEUKOCYTE ESTERASE UR QL STRIP: 100
LG PLATELETS BLD QL SMEAR: PRESENT
LIPASE SERPL-CCNC: 28 U/L (ref 23–300)
LYMPHOCYTES # BLD AUTO: 1.1 THOUSAND/UL (ref 0.6–4.47)
LYMPHOCYTES # BLD AUTO: 6 % (ref 14–44)
MCH RBC QN AUTO: 31.2 PG (ref 26.8–34.3)
MCHC RBC AUTO-ENTMCNC: 34.7 G/DL (ref 31.4–37.4)
MCV RBC AUTO: 90 FL (ref 82–98)
MONOCYTES # BLD AUTO: 0.55 THOUSAND/UL (ref 0–1.22)
MONOCYTES NFR BLD: 3 % (ref 4–12)
MUCOUS THREADS UR QL AUTO: ABNORMAL
NEUTROPHILS # BLD MANUAL: 16.71 THOUSAND/UL (ref 1.85–7.62)
NEUTS SEG NFR BLD AUTO: 91 % (ref 43–75)
NITRITE UR QL STRIP: NEGATIVE
NON-SQ EPI CELLS URNS QL MICRO: ABNORMAL /HPF
PH UR STRIP.AUTO: 8 [PH]
PLATELET # BLD AUTO: 260 THOUSANDS/UL (ref 149–390)
PLATELET BLD QL SMEAR: ADEQUATE
PMV BLD AUTO: 11.3 FL (ref 8.9–12.7)
POTASSIUM SERPL-SCNC: 3.5 MMOL/L (ref 3.5–5.3)
PROT SERPL-MCNC: 8.1 G/DL (ref 6.4–8.4)
PROT UR STRIP-MCNC: ABNORMAL MG/DL
RBC # BLD AUTO: 4.2 MILLION/UL (ref 3.81–5.12)
RBC #/AREA URNS AUTO: ABNORMAL /HPF
RBC MORPH BLD: NORMAL
SODIUM SERPL-SCNC: 144 MMOL/L (ref 135–147)
SP GR UR STRIP.AUTO: 1.01 (ref 1–1.04)
UROBILINOGEN UA: NEGATIVE MG/DL
WBC # BLD AUTO: 18.36 THOUSAND/UL (ref 4.31–10.16)
WBC #/AREA URNS AUTO: ABNORMAL /HPF

## 2022-12-30 RX ORDER — ONDANSETRON 4 MG/1
4 TABLET, FILM COATED ORAL EVERY 8 HOURS PRN
Qty: 20 TABLET | Refills: 0 | Status: SHIPPED | OUTPATIENT
Start: 2022-12-30

## 2022-12-30 RX ORDER — ONDANSETRON 4 MG/1
4 TABLET, FILM COATED ORAL EVERY 8 HOURS PRN
Qty: 20 TABLET | Refills: 0 | Status: SHIPPED | OUTPATIENT
Start: 2022-12-30 | End: 2022-12-30 | Stop reason: SDUPTHER

## 2022-12-30 RX ORDER — ONDANSETRON 2 MG/ML
4 INJECTION INTRAMUSCULAR; INTRAVENOUS ONCE
Status: COMPLETED | OUTPATIENT
Start: 2022-12-30 | End: 2022-12-30

## 2022-12-30 RX ORDER — SUCRALFATE 1 G/1
1 TABLET ORAL 4 TIMES DAILY
Qty: 40 TABLET | Refills: 0 | Status: SHIPPED | OUTPATIENT
Start: 2022-12-30 | End: 2022-12-30 | Stop reason: SDUPTHER

## 2022-12-30 RX ORDER — SUCRALFATE 1 G/1
1 TABLET ORAL 4 TIMES DAILY
Qty: 40 TABLET | Refills: 0 | Status: SHIPPED | OUTPATIENT
Start: 2022-12-30 | End: 2023-01-09

## 2022-12-30 RX ORDER — OMEPRAZOLE 20 MG/1
20 CAPSULE, DELAYED RELEASE ORAL DAILY
Qty: 30 CAPSULE | Refills: 0 | Status: SHIPPED | OUTPATIENT
Start: 2022-12-30 | End: 2022-12-30 | Stop reason: SDUPTHER

## 2022-12-30 RX ORDER — OMEPRAZOLE 20 MG/1
20 CAPSULE, DELAYED RELEASE ORAL DAILY
Qty: 30 CAPSULE | Refills: 0 | Status: SHIPPED | OUTPATIENT
Start: 2022-12-30

## 2022-12-30 RX ORDER — SODIUM CHLORIDE 9 MG/ML
250 INJECTION, SOLUTION INTRAVENOUS CONTINUOUS
Status: DISCONTINUED | OUTPATIENT
Start: 2022-12-30 | End: 2022-12-30 | Stop reason: HOSPADM

## 2022-12-30 RX ADMIN — SODIUM CHLORIDE 250 ML/HR: 0.9 INJECTION, SOLUTION INTRAVENOUS at 13:44

## 2022-12-30 RX ADMIN — ONDANSETRON 4 MG: 2 INJECTION INTRAMUSCULAR; INTRAVENOUS at 13:44

## 2022-12-30 NOTE — ED PROVIDER NOTES
History  Chief Complaint   Patient presents with   • Vomiting     Reports vomiting and nausea; states she drank a lot of alcohol last night  Has been vomiting since 7am     Pt with nausea and vomiting all morning since drinking a lot of etoh last stephani , no other complaints       Nausea  The primary symptoms include abdominal pain, nausea and vomiting  Primary symptoms do not include diarrhea  The illness began today  The abdominal pain began today  The abdominal pain is located in the epigastric region  The abdominal pain does not radiate  The severity of the abdominal pain is 3/10  The abdominal pain is relieved by nothing  The illness does not include chills  Associated medical issues do not include inflammatory bowel disease  Prior to Admission Medications   Prescriptions Last Dose Informant Patient Reported? Taking?    cholecalciferol (VITAMIN D3) 1,000 units tablet   No No   Sig: Take 1 tablet (1,000 Units total) by mouth daily Over the counter   medroxyPROGESTERone (DEPO-PROVERA) 150 mg/mL injection   No No   Sig: Inject 1 mL (150 mg total) into a muscle every 3 (three) months      Facility-Administered Medications: None       Past Medical History:   Diagnosis Date   • ADHD    • Eczematous dermatitis     LAST ASSESSED: 11/16/17   • Microscopic hematuria     LAST ASSESSED: 8/17/17   • Narcolepsy    • Vitamin D deficiency     LAST ASSESSED: 8/17/17       Past Surgical History:   Procedure Laterality Date   • NO PAST SURGERIES         Family History   Problem Relation Age of Onset   • Sick sinus syndrome Paternal Grandmother         TACHYCARDIA   • Alcohol abuse Mother    • Hypertension Paternal Grandfather    • Stroke Paternal Grandfather    • Hyperlipidemia Paternal Grandfather    • No Known Problems Sister    • No Known Problems Brother    • Diabetes Maternal Grandmother    • Alcohol abuse Maternal Grandmother         stong history of alcoholism on mom's side of family    • No Known Problems Maternal Grandfather      I have reviewed and agree with the history as documented  E-Cigarette/Vaping   • E-Cigarette Use Current Some Day User    • Start Date 6/30/21    • Cartridges/Day less than one      E-Cigarette/Vaping Substances   • Nicotine Yes    • THC No    • CBD No    • Flavoring Yes    • Other No    • Unknown No      Social History     Tobacco Use   • Smoking status: Never   • Smokeless tobacco: Never   Vaping Use   • Vaping Use: Some days   • Start date: 6/30/2021   • Substances: Nicotine, Flavoring   Substance Use Topics   • Alcohol use: Yes   • Drug use: Never       Review of Systems   Constitutional: Negative  Negative for chills  HENT: Negative  Eyes: Negative  Respiratory: Negative  Cardiovascular: Negative  Gastrointestinal: Positive for abdominal pain, nausea and vomiting  Negative for diarrhea  Endocrine: Negative  Genitourinary: Negative  Musculoskeletal: Negative  Skin: Negative  Allergic/Immunologic: Negative  Neurological: Negative  Hematological: Negative  Psychiatric/Behavioral: Negative  All other systems reviewed and are negative  Physical Exam  Physical Exam  Vitals and nursing note reviewed  Constitutional:       Appearance: Normal appearance  She is normal weight  Comments: 300pm pt is pain free and nausea free  Pt feels much much better    HENT:      Head: Normocephalic and atraumatic  Right Ear: Tympanic membrane, ear canal and external ear normal       Left Ear: Tympanic membrane, ear canal and external ear normal       Nose: Nose normal       Mouth/Throat:      Mouth: Mucous membranes are moist       Pharynx: Oropharynx is clear  Eyes:      Extraocular Movements: Extraocular movements intact  Conjunctiva/sclera: Conjunctivae normal       Pupils: Pupils are equal, round, and reactive to light  Cardiovascular:      Rate and Rhythm: Normal rate and regular rhythm  Pulses: Normal pulses        Heart sounds: Normal heart sounds  Pulmonary:      Effort: Pulmonary effort is normal       Breath sounds: Normal breath sounds  Abdominal:      General: Abdomen is flat  Bowel sounds are normal       Palpations: Abdomen is soft  Comments: midepigastric tenderness   Suprapubic tenderness    Musculoskeletal:         General: Normal range of motion  Cervical back: Normal range of motion and neck supple  Skin:     General: Skin is warm  Capillary Refill: Capillary refill takes less than 2 seconds  Neurological:      General: No focal deficit present  Mental Status: She is alert and oriented to person, place, and time     Psychiatric:         Mood and Affect: Mood normal          Behavior: Behavior normal          Vital Signs  ED Triage Vitals [12/30/22 1304]   Temperature Pulse Respirations Blood Pressure SpO2   97 6 °F (36 4 °C) 94 16 123/68 100 %      Temp Source Heart Rate Source Patient Position - Orthostatic VS BP Location FiO2 (%)   Tympanic Monitor Sitting Left arm --      Pain Score       --           Vitals:    12/30/22 1304 12/30/22 1523   BP: 123/68 111/62   Pulse: 94 69   Patient Position - Orthostatic VS: Sitting Lying         Visual Acuity      ED Medications  Medications   sodium chloride 0 9 % infusion (0 mL/hr Intravenous Stopped 12/30/22 1523)   ondansetron (ZOFRAN) injection 4 mg (4 mg Intravenous Given 12/30/22 1344)       Diagnostic Studies  Results Reviewed     Procedure Component Value Units Date/Time    Urine Microscopic [[id number]]  (Abnormal) Collected: 12/30/22 1501    Lab Status: Final result Specimen: Urine, Clean Catch Updated: 12/30/22 1556     RBC, UA None Seen /hpf      WBC, UA 4-10 /hpf      Epithelial Cells Moderate /hpf      Bacteria, UA Moderate /hpf      MUCUS THREADS Moderate    UA w Reflex to Microscopic w Reflex to Culture [[id number]]  (Abnormal) Collected: 12/30/22 1501    Lab Status: Final result Specimen: Urine, Clean Catch Updated: 12/30/22 1512     Color, UA Yellow Clarity, UA Clear     Specific Gravity, UA 1 010     pH, UA 8 0     Leukocytes,  0     Nitrite, UA Negative     Protein, UA 30 (1+) mg/dl      Glucose, UA Negative mg/dl      Ketones, UA Negative mg/dl      Bilirubin, UA Negative     Occult Blood, UA Negative     UROBILINOGEN UA Negative mg/dL     POCT pregnancy, urine [[id number]]  (Normal) Resulted: 12/30/22 1504    Lab Status: Final result Updated: 12/30/22 1504     EXT Preg Test, Ur Negative     Control Valid    hCG, qualitative pregnancy [[id number]]  (Normal) Collected: 12/30/22 1344    Lab Status: Final result Specimen: Blood from Arm, Left Updated: 12/30/22 1500     Preg, Serum Negative    Manual Differential(PHLEBS Do Not Order) [[id number]]  (Abnormal) Collected: 12/30/22 1344    Lab Status: Final result Specimen: Blood from Arm, Left Updated: 12/30/22 1422     Segmented % 91 %      Lymphocytes % 6 %      Monocytes % 3 %      Eosinophils, % 0 %      Basophils % 0 %      Absolute Neutrophils 16 71 Thousand/uL      Lymphocytes Absolute 1 10 Thousand/uL      Monocytes Absolute 0 55 Thousand/uL      Eosinophils Absolute 0 00 Thousand/uL      Basophils Absolute 0 00 Thousand/uL      Total Counted --     RBC Morphology Normal     Platelet Estimate Adequate     Large Platelet Present    Lipase [[id number]]  (Normal) Collected: 12/30/22 1344    Lab Status: Final result Specimen: Blood from Arm, Left Updated: 12/30/22 1412     Lipase 28 u/L     Ethanol [[id number]]  (Abnormal) Collected: 12/30/22 1344    Lab Status: Final result Specimen: Blood from Arm, Left Updated: 12/30/22 1412     Ethanol Lvl 13 mg/dL     Comprehensive metabolic panel [[id number]]  (Abnormal) Collected: 12/30/22 1344    Lab Status: Final result Specimen: Blood from Arm, Left Updated: 12/30/22 1412     Sodium 144 mmol/L      Potassium 3 5 mmol/L      Chloride 108 mmol/L      CO2 25 mmol/L      ANION GAP 11 mmol/L      BUN 12 mg/dL      Creatinine 0 88 mg/dL      Glucose 117 mg/dL      Calcium 9 2 mg/dL      AST 23 U/L      ALT 8 U/L      Alkaline Phosphatase 99 U/L      Total Protein 8 1 g/dL      Albumin 4 5 g/dL      Total Bilirubin 0 59 mg/dL      eGFR 94 ml/min/1 73sq m     Narrative:      [place] guidelines for Chronic Kidney Disease (CKD):   •  Stage 1 with normal or high GFR (GFR > 90 mL/min/1 73 square meters)  •  Stage 2 Mild CKD (GFR = 60-89 mL/min/1 73 square meters)  •  Stage 3A Moderate CKD (GFR = 45-59 mL/min/1 73 square meters)  •  Stage 3B Moderate CKD (GFR = 30-44 mL/min/1 73 square meters)  •  Stage 4 Severe CKD (GFR = 15-29 mL/min/1 73 square meters)  •  Stage 5 End Stage CKD (GFR <15 mL/min/1 73 square meters)  Note: GFR calculation is accurate only with a steady state creatinine    CBC and differential [[id number]]  (Abnormal) Collected: 12/30/22 1344    Lab Status: Final result Specimen: Blood from Arm, Left Updated: 12/30/22 1358     WBC 18 36 Thousand/uL      RBC 4 20 Million/uL      Hemoglobin 13 1 g/dL      Hematocrit 37 7 %      MCV 90 fL      MCH 31 2 pg      MCHC 34 7 g/dL      RDW 12 6 %      MPV 11 3 fL      Platelets 123 Thousands/uL     Narrative: This is an appended report  These results have been appended to a previously verified report  No orders to display              Procedures  Procedures         ED Course                                             MDM    Disposition  Final diagnoses:   Nausea & vomiting     Time reflects when diagnosis was documented in both MDM as applicable and the Disposition within this note     Time User Action Codes Description Comment    12/30/2022  3:09 PM Doc Sarkar  Add [R11 2] Nausea & vomiting       ED Disposition     ED Disposition   Discharge    Condition   Stable    Date/Time   Fri Dec 30, 2022  3:08 PM    Comment   Manuel Hosford discharge to home/self care                 Follow-up Information     Follow up With Specialties Details Why Hubert Barnes MD Family [facility]   [facility] [id number]  [phone]            Discharge Medication List as of 12/30/2022  3:12 PM      CONTINUE these medications which have CHANGED    Details   omeprazole (PriLOSEC) 20 mg delayed release capsule Take 1 capsule (20 mg total) by mouth daily, Starting Fri 12/30/2022, Print      ondansetron (ZOFRAN) 4 mg tablet Take 1 tablet (4 mg total) by mouth every 8 (eight) hours as needed for nausea or vomiting, Starting Fri 12/30/2022, Print      sucralfate (CARAFATE) 1 g tablet Take 1 tablet (1 g total) by mouth 4 (four) times a day for 10 days, Starting Fri 12/30/2022, Until Mon 1/9/2023, Print         CONTINUE these medications which have NOT CHANGED    Details   cholecalciferol (VITAMIN D3) 1,000 units tablet Take 1 tablet (1,000 Units total) by mouth daily Over the counter, Starting Tue 5/11/2021, No Print      medroxyPROGESTERone (DEPO-PROVERA) 150 mg/mL injection Inject 1 mL (150 mg total) into a muscle every 3 (three) months, Starting Tue 2/22/2022, Normal             No discharge procedures on file      PDMP Review       Value Time User    PDMP Reviewed  Yes 5/25/2021 10:16 AM Jovita Hernandez DO          ED Provider  Electronically Signed by           Marichuy Valadez PA-C  12/30/22 1472

## 2023-03-20 NOTE — PROGRESS NOTES
Assessment/Plan:  - Currently on menses, no swab obtained  Will treat empirically for BV and yeast  - Start metrogel when period is over  - Begin diflucan at this time  - Patient to go to lab for [facility] when period is over   - Call for concerns or if sx do not resolve       Diagnoses and all orders for this visit:    Acute vaginitis    Dysuria  -     POCT urine dip auto non-scope          Subjective:      Patient ID: Jerry Salcedo is a 25 y o  female  Elen Riggins is a 25YO [de-identified] female presenting to the office with complaints of vaginal discharge and odor x a month Patient states the discharge is yellow and has a fishy odor  Patient states she could notice her own odor at the gym  She has not had anything like this before  She states it is worse with sweating  The right side of her vulva is itchy  She notes burning with urination as the urine runs over her labia  She denies any new products  She does report using baby wipes after a BM  Patient states she uses a pH vaginal wash  She denies recent abx use  She has not been sexually active in over a month  She was previously sexually active with one partner  The following portions of the patient's history were reviewed and updated as appropriate: allergies, current medications, past family history, past medical history, past social history, past surgical history and problem list     Review of Systems   Constitutional: Negative for chills, fever and unexpected weight change  Respiratory: Negative for shortness of breath  Cardiovascular: Negative for chest pain  Gastrointestinal: Negative for abdominal pain  Genitourinary: Positive for vaginal bleeding and vaginal discharge  Skin: Negative for rash  Objective:      BP 98/58 (BP Location: Left arm, Patient Position: Sitting, Cuff Size: Standard)   Ht 5' 3" (1 6 m)   Wt 70 9 kg (156 lb 3 2 oz)   LMP 03/20/2023 (Exact Date)   BMI 27 67 kg/m²          Physical Exam  Vitals reviewed     Constitutional: Appearance: Normal appearance  HENT:      Head: Normocephalic and atraumatic  Genitourinary:      Skin:     General: Skin is warm and dry  Neurological:      General: No focal deficit present  Mental Status: She is alert     Psychiatric:         Mood and Affect: Mood normal          Behavior: Behavior normal

## 2023-03-21 ENCOUNTER — OFFICE VISIT (OUTPATIENT)
Dept: OBGYN CLINIC | Facility: CLINIC | Age: 23
End: 2023-03-21

## 2023-03-21 VITALS
WEIGHT: 156.2 LBS | DIASTOLIC BLOOD PRESSURE: 58 MMHG | SYSTOLIC BLOOD PRESSURE: 98 MMHG | HEIGHT: 63 IN | BODY MASS INDEX: 27.68 KG/M2

## 2023-03-21 DIAGNOSIS — R30.0 DYSURIA: Primary | ICD-10-CM

## 2023-03-21 DIAGNOSIS — N76.0 ACUTE VAGINITIS: ICD-10-CM

## 2023-03-21 LAB
SL AMB  POCT GLUCOSE, UA: NEGATIVE
SL AMB LEUKOCYTE ESTERASE,UA: NEGATIVE
SL AMB POCT BILIRUBIN,UA: NEGATIVE
SL AMB POCT BLOOD,UA: ABNORMAL
SL AMB POCT CLARITY,UA: CLEAR
SL AMB POCT COLOR,UA: YELLOW
SL AMB POCT KETONES,UA: NEGATIVE
SL AMB POCT NITRITE,UA: NEGATIVE
SL AMB POCT PH,UA: 6
SL AMB POCT SPECIFIC GRAVITY,UA: 1.03
SL AMB POCT URINE PROTEIN: 15
SL AMB POCT UROBILINOGEN: 0.2

## 2023-03-21 RX ORDER — FLUCONAZOLE 150 MG/1
150 TABLET ORAL
Qty: 3 TABLET | Refills: 0 | Status: SHIPPED | OUTPATIENT
Start: 2023-03-21 | End: 2023-03-28

## 2023-03-21 RX ORDER — METRONIDAZOLE 7.5 MG/G
1 GEL VAGINAL
Qty: 5 G | Refills: 0 | Status: SHIPPED | OUTPATIENT
Start: 2023-03-21 | End: 2023-03-26

## 2023-03-22 ENCOUNTER — TELEPHONE (OUTPATIENT)
Dept: OBGYN CLINIC | Facility: CLINIC | Age: 23
End: 2023-03-22

## 2023-03-22 LAB — BACTERIA UR CULT: NORMAL

## 2023-04-24 ENCOUNTER — TELEPHONE (OUTPATIENT)
Dept: OBGYN CLINIC | Facility: CLINIC | Age: 23
End: 2023-04-24

## 2023-04-24 DIAGNOSIS — B37.31 YEAST VAGINITIS: Primary | ICD-10-CM

## 2023-04-24 RX ORDER — FLUCONAZOLE 150 MG/1
150 TABLET ORAL ONCE
Qty: 1 TABLET | Refills: 0 | Status: SHIPPED | OUTPATIENT
Start: 2023-04-24 | End: 2023-04-24

## 2023-04-24 NOTE — TELEPHONE ENCOUNTER
Patient called, she is having symptoms of a yeast infection  She has white clumpy discharge and vaginal itching  She was here for her yearly on 3/21 and saw Haremet Cabrera  Can you send a script of Diflucan to the pharmacy for her?  She uses [facility]

## 2023-04-27 ENCOUNTER — OFFICE VISIT (OUTPATIENT)
Dept: OBGYN CLINIC | Facility: CLINIC | Age: 23
End: 2023-04-27

## 2023-04-27 VITALS
DIASTOLIC BLOOD PRESSURE: 58 MMHG | WEIGHT: 157 LBS | HEIGHT: 63 IN | SYSTOLIC BLOOD PRESSURE: 102 MMHG | BODY MASS INDEX: 27.82 KG/M2

## 2023-04-27 DIAGNOSIS — N76.0 ACUTE VAGINITIS: Primary | ICD-10-CM

## 2023-04-27 NOTE — PROGRESS NOTES
"  Assessment/Plan     Wet prep collected but difficult to interpret d/t copious RBCs in sample  Sureswab with GC/CT testing ordered  Discussed empiric tx vs waiting for results, pt elected to wait        CHIEF COMPLAINT: vaginal discharge and itching      Subjective     Lucrecia Lindsey is a 25 y o  female who presents for evaluation of an abnormal vaginal discharge  Symptoms have been present for 4 days  Vaginal symptoms: local irritation, odor and vulvar itching  Contraception: none  She denies abnormal bleeding and blisters Sexually transmitted infection risk: possible STD exposure - partner is in the army and just got back into town so has been having more frequent sex  Menstrual flow: regular every 28-30 days  Took diflucan 2 days ago with no relief  ROS:   She denies hematuria, dysuria, constipation, diarrhea, fevers, chills, nausea or emesis  Patient Active Problem List   Diagnosis   • Attention deficit disorder (ADD) without hyperactivity   • Irregular menstrual cycle   • Vitamin D deficiency   • Dysmenorrhea   • ISABELA (obstructive sleep apnea)   • Primary narcolepsy without cataplexy       The following portions of the patient's history were reviewed and updated as appropriate: allergies, current medications, past family history, past medical history, past social history, past surgical history and problem list     /58 (BP Location: Right arm, Patient Position: Sitting, Cuff Size: Standard)   Ht 5' 3\" (1 6 m)   Wt 71 2 kg (157 lb)   LMP 04/27/2023 (Exact Date)   BMI 27 81 kg/m²     GEN: The patient was alert and oriented x3, pleasant well-appearing female in no acute distress  Pelvic: Normal appearing external female genitalia, erythematous but otherwise normal appearing vaginal epithelium, normalappearing cervix  negative discharge noted   Moderate bleeding noted c/w current menses                 "

## 2023-04-29 LAB
BV BACTERIA RRNA VAG QL NAA+PROBE: POSITIVE
C GLABRATA RNA VAG QL NAA+PROBE: NOT DETECTED
C TRACH RRNA SPEC QL NAA+PROBE: DETECTED
CANDIDA RRNA VAG QL PROBE: DETECTED
N GONORRHOEA RRNA SPEC QL NAA+PROBE: NOT DETECTED
T VAGINALIS RRNA SPEC QL NAA+PROBE: NOT DETECTED

## 2023-04-30 DIAGNOSIS — A74.9 CHLAMYDIA INFECTION: Primary | ICD-10-CM

## 2023-04-30 DIAGNOSIS — B37.31 YEAST VAGINITIS: ICD-10-CM

## 2023-04-30 RX ORDER — DOXYCYCLINE 100 MG/1
100 TABLET ORAL 2 TIMES DAILY
Qty: 14 TABLET | Refills: 0 | Status: SHIPPED | OUTPATIENT
Start: 2023-04-30 | End: 2023-05-07

## 2023-04-30 RX ORDER — FLUCONAZOLE 150 MG/1
150 TABLET ORAL
Qty: 3 TABLET | Refills: 0 | Status: SHIPPED | OUTPATIENT
Start: 2023-04-30 | End: 2023-05-07

## 2023-05-18 ENCOUNTER — TELEPHONE (OUTPATIENT)
Dept: OBGYN CLINIC | Facility: CLINIC | Age: 23
End: 2023-05-18

## 2023-05-18 DIAGNOSIS — Z20.2 EXPOSURE TO STD: Primary | ICD-10-CM

## 2023-05-18 NOTE — TELEPHONE ENCOUNTER
Orders placed for STD blood work (HIV, RPR, hepatitis)  I do not recommend blood work for HSV  If she has symptoms of an outbreak she should call the office for an appt  She does not necessarily need a test of cure for her recently diagnosed chlamydia but if she wants to be tested again for that she would need to be at least 4 weeks out from taking antibiotics and she would have to be seen in the office

## 2023-05-18 NOTE — TELEPHONE ENCOUNTER
Pt was seen on 4/27/23  She states that her boyfriend just tested positive for HSV 1&2 and wants to know if she can be tested for everything or if she needs to come in for an appt  She does not have any current vaginal symptoms

## 2023-05-23 ENCOUNTER — APPOINTMENT (OUTPATIENT)
Dept: LAB | Facility: CLINIC | Age: 23
End: 2023-05-23

## 2023-05-23 DIAGNOSIS — N76.0 ACUTE VAGINITIS: ICD-10-CM

## 2023-05-23 DIAGNOSIS — Z20.2 EXPOSURE TO STD: ICD-10-CM

## 2023-05-23 LAB
HIV 1+2 AB+HIV1 P24 AG SERPL QL IA: NORMAL
HIV 2 AB SERPL QL IA: NORMAL
HIV1 AB SERPL QL IA: NORMAL
HIV1 P24 AG SERPL QL IA: NORMAL
TREPONEMA PALLIDUM IGG+IGM AB [PRESENCE] IN SERUM OR PLASMA BY IMMUNOASSAY: NORMAL

## 2023-05-24 LAB
C TRACH DNA SPEC QL NAA+PROBE: NEGATIVE
HBV SURFACE AG SER QL: NORMAL
N GONORRHOEA DNA SPEC QL NAA+PROBE: NEGATIVE

## 2023-06-13 ENCOUNTER — OFFICE VISIT (OUTPATIENT)
Dept: OBGYN CLINIC | Facility: CLINIC | Age: 23
End: 2023-06-13
Payer: COMMERCIAL

## 2023-06-13 VITALS
DIASTOLIC BLOOD PRESSURE: 72 MMHG | WEIGHT: 158 LBS | BODY MASS INDEX: 28 KG/M2 | HEIGHT: 63 IN | SYSTOLIC BLOOD PRESSURE: 110 MMHG

## 2023-06-13 DIAGNOSIS — Z20.828 EXPOSURE TO HERPES SIMPLEX VIRUS (HSV): Primary | ICD-10-CM

## 2023-06-13 DIAGNOSIS — Z11.3 SCREENING FOR STDS (SEXUALLY TRANSMITTED DISEASES): ICD-10-CM

## 2023-06-13 PROCEDURE — 99213 OFFICE O/P EST LOW 20 MIN: CPT | Performed by: OBSTETRICS & GYNECOLOGY

## 2023-06-13 NOTE — PROGRESS NOTES
Assessment Myrna Mitchell was seen today for exposure to std  Diagnoses and all orders for this visit:    Screening for STDs (sexually transmitted diseases)  -     HSV TYPE 1,2 DNA PCR [facility] ONLY         Plan   Discussed limitations of serologic testing for HSV  No evidence of current outbreak  Reviewed general vulvar hygiene  Continue to track menses and RTO if consistent abnormal bleeding        Subjective     Cristiana Clements is a 25 y o  female here for a problem visit  Patient wants to get herpes testing done  Her partner got serology testing done after her recent dx of chlamydia and he was positive  She does not think she has ever had an outbreak and he does not recall ever having an outbreak  She has occasional vulvar itching, not sure if it is an infection or irritation of her clothes  She has noticed itching more often since he told her his results but she is not sure if that's because she's worried she may have an outbreak  Additionally she had an isolated episode of breakthrough bleeding  LMP was  which was a normal period at her normal time  Then had bleeding again starting  that was light and lasted 3-4 days  No further bleeding since  Patient Active Problem List   Diagnosis   • Attention deficit disorder (ADD) without hyperactivity   • Irregular menstrual cycle   • Vitamin D deficiency   • Dysmenorrhea   • ISABELA (obstructive sleep apnea)   • Primary narcolepsy without cataplexy         Gynecologic History  Patient's last menstrual period was 2023 (exact date)        Obstetric History  OB History    Para Term  AB Living   0 0 0 0 0 0   SAB IAB Ectopic Multiple Live Births   0 0 0 0 0   Obstetric Comments   Menarche 16        Past Medical/Surgical/Family/Social History  The following portions of the patient's history were reviewed and updated as appropriate: allergies, current medications, past family history, past medical history, past social history, past surgical "history and problem list     Allergies  Patient has no known allergies  Medications  No current outpatient medications on file        Review of Systems  Constitutional: no fever, feels well  Gastrointestinal: no complaints of abdominal pain, constipation, nausea  Genitourinary : see HPI       Objective     /72 (BP Location: Right arm, Patient Position: Sitting, Cuff Size: Standard)   Ht 5' 3\" (1 6 m)   Wt 71 7 kg (158 lb)   LMP 05/24/2023 (Exact Date)   BMI 27 99 kg/m²     General: alert and oriented, in no acute distress  Vulva: normal, Bartholin's, Urethra, [place]'s normal  Vagina: normal mucosa, normal discharge  Cervix:  no cervical motion tenderness, no lesions and nulliparous appearance      "

## 2023-12-20 ENCOUNTER — OFFICE VISIT (OUTPATIENT)
Dept: OBGYN CLINIC | Facility: CLINIC | Age: 23
End: 2023-12-20
Payer: COMMERCIAL

## 2023-12-20 VITALS
BODY MASS INDEX: 28.17 KG/M2 | HEIGHT: 63 IN | WEIGHT: 159 LBS | SYSTOLIC BLOOD PRESSURE: 100 MMHG | DIASTOLIC BLOOD PRESSURE: 58 MMHG

## 2023-12-20 DIAGNOSIS — N89.8 VAGINAL DISCHARGE: ICD-10-CM

## 2023-12-20 DIAGNOSIS — N94.10 DYSPAREUNIA IN FEMALE: ICD-10-CM

## 2023-12-20 DIAGNOSIS — R10.2 PELVIC PAIN: Primary | ICD-10-CM

## 2023-12-20 PROCEDURE — 99213 OFFICE O/P EST LOW 20 MIN: CPT | Performed by: PHYSICIAN ASSISTANT

## 2023-12-20 NOTE — PROGRESS NOTES
"Assessment/Plan:  - Infection testing collected  - Pelvic ultrasound ordered  - Pt not on contraception and not using condoms. Does not desire pregnancy. Discussed risk of pregnancy due to unprotected intercourse.   - Will reach out to patient with results       Diagnoses and all orders for this visit:    Pelvic pain  -     US pelvis complete w transvaginal; Future  -     Sureswab(R), Vaginosis/Vaginitis Plus    Dyspareunia in female  -     US pelvis complete w transvaginal; Future  -     Sureswab(R), Vaginosis/Vaginitis Plus    Vaginal discharge  -     Sureswab(R), Vaginosis/Vaginitis Plus          Subjective:      Patient ID: Rosetta Delarosa is a 23 y.o. female.    Rosetta is a 22YO G0 female presenting to the office with complaints of pain during intercourse x 6 months. Patient states this was not a problem in the past. She reports it is a deep pain that occurs during intercourse and resolves after. She reports some mild vaginal discharge with odor, but cannot describe the odor. She denies vaginal itching, new soaps/detergents or recent abx use. She is not using hormonal contraception and is not using condoms. She does not desire pregnancy at this time.         The following portions of the patient's history were reviewed and updated as appropriate: allergies, current medications, past family history, past medical history, past social history, past surgical history, and problem list.    Review of Systems   Respiratory:  Negative for shortness of breath.    Cardiovascular:  Negative for chest pain.   Gastrointestinal:  Negative for abdominal pain, constipation, diarrhea, nausea and vomiting.   Genitourinary:  Positive for dyspareunia, pelvic pain and vaginal discharge. Negative for vaginal bleeding and vaginal pain.   Skin:  Negative for rash.         Objective:      /58   Ht 5' 3\" (1.6 m)   Wt 72.1 kg (159 lb)   LMP 12/09/2023   BMI 28.17 kg/m²          Physical Exam  Vitals reviewed.   Constitutional:  "      Appearance: Normal appearance.   HENT:      Head: Normocephalic and atraumatic.   Pulmonary:      Effort: Pulmonary effort is normal.   Abdominal:      General: There is no distension.      Palpations: Abdomen is soft.      Tenderness: There is no abdominal tenderness.   Genitourinary:     General: Normal vulva.      Exam position: Lithotomy position.      Pubic Area: No rash.       Labia:         Right: No rash or lesion.         Left: No rash or lesion.       Vagina: Vaginal discharge (minimal white discharge) present. No tenderness or lesions.      Cervix: No discharge or lesion.      Uterus: Normal. Not tender.       Adnexa: Right adnexa normal and left adnexa normal.        Right: No mass or tenderness.          Left: No mass or tenderness.     Skin:     General: Skin is warm and dry.      Findings: No lesion or rash.   Neurological:      General: No focal deficit present.      Mental Status: She is alert.

## 2023-12-21 LAB
BV BACTERIA RRNA VAG QL NAA+PROBE: NEGATIVE
C GLABRATA RNA VAG QL NAA+PROBE: NOT DETECTED
C TRACH RRNA SPEC QL NAA+PROBE: NOT DETECTED
CANDIDA RRNA VAG QL PROBE: DETECTED
N GONORRHOEA RRNA SPEC QL NAA+PROBE: NOT DETECTED
T VAGINALIS RRNA SPEC QL NAA+PROBE: NOT DETECTED

## 2023-12-22 DIAGNOSIS — B37.31 YEAST VAGINITIS: Primary | ICD-10-CM

## 2023-12-22 RX ORDER — FLUCONAZOLE 150 MG/1
150 TABLET ORAL
Qty: 3 TABLET | Refills: 0 | Status: SHIPPED | OUTPATIENT
Start: 2023-12-22 | End: 2023-12-29

## 2024-02-12 DIAGNOSIS — N76.0 ACUTE VAGINITIS: Primary | ICD-10-CM

## 2024-02-12 RX ORDER — METRONIDAZOLE 7.5 MG/G
1 GEL VAGINAL
Qty: 5 G | Refills: 0 | Status: SHIPPED | OUTPATIENT
Start: 2024-02-12 | End: 2024-02-17

## 2024-02-12 RX ORDER — FLUCONAZOLE 150 MG/1
150 TABLET ORAL
Qty: 3 TABLET | Refills: 0 | Status: SHIPPED | OUTPATIENT
Start: 2024-02-12 | End: 2024-02-19

## 2024-03-26 ENCOUNTER — TELEPHONE (OUTPATIENT)
Dept: FAMILY MEDICINE CLINIC | Facility: CLINIC | Age: 24
End: 2024-03-26

## 2024-03-27 NOTE — TELEPHONE ENCOUNTER
enStageil does not work, sent my chart message and sent letter in the mail for patient to call and schedule overdue appointment or advise of new PCP